# Patient Record
Sex: FEMALE | Race: WHITE | Employment: OTHER | URBAN - METROPOLITAN AREA
[De-identification: names, ages, dates, MRNs, and addresses within clinical notes are randomized per-mention and may not be internally consistent; named-entity substitution may affect disease eponyms.]

---

## 2012-09-08 LAB — PAP SMEAR, EXTERNAL: NORMAL

## 2017-11-01 LAB — COLONOSCOPY, EXTERNAL: NORMAL

## 2020-02-19 LAB — HBA1C MFR BLD HPLC: 6.4 %

## 2021-06-14 ENCOUNTER — OFFICE VISIT (OUTPATIENT)
Dept: FAMILY MEDICINE CLINIC | Age: 86
End: 2021-06-14
Payer: MEDICARE

## 2021-06-14 VITALS
OXYGEN SATURATION: 97 % | RESPIRATION RATE: 12 BRPM | HEART RATE: 56 BPM | HEIGHT: 58 IN | TEMPERATURE: 97 F | BODY MASS INDEX: 22.46 KG/M2 | SYSTOLIC BLOOD PRESSURE: 97 MMHG | WEIGHT: 107 LBS | DIASTOLIC BLOOD PRESSURE: 58 MMHG

## 2021-06-14 DIAGNOSIS — R29.898 WEAKNESS OF BOTH LEGS: ICD-10-CM

## 2021-06-14 DIAGNOSIS — Z76.89 ENCOUNTER TO ESTABLISH CARE: Primary | ICD-10-CM

## 2021-06-14 DIAGNOSIS — E78.5 HYPERLIPIDEMIA, UNSPECIFIED HYPERLIPIDEMIA TYPE: ICD-10-CM

## 2021-06-14 PROCEDURE — 1101F PT FALLS ASSESS-DOCD LE1/YR: CPT | Performed by: FAMILY MEDICINE

## 2021-06-14 PROCEDURE — G8420 CALC BMI NORM PARAMETERS: HCPCS | Performed by: FAMILY MEDICINE

## 2021-06-14 PROCEDURE — G8432 DEP SCR NOT DOC, RNG: HCPCS | Performed by: FAMILY MEDICINE

## 2021-06-14 PROCEDURE — 99203 OFFICE O/P NEW LOW 30 MIN: CPT | Performed by: FAMILY MEDICINE

## 2021-06-14 PROCEDURE — G8427 DOCREV CUR MEDS BY ELIG CLIN: HCPCS | Performed by: FAMILY MEDICINE

## 2021-06-14 PROCEDURE — 1090F PRES/ABSN URINE INCON ASSESS: CPT | Performed by: FAMILY MEDICINE

## 2021-06-14 PROCEDURE — G8536 NO DOC ELDER MAL SCRN: HCPCS | Performed by: FAMILY MEDICINE

## 2021-06-14 RX ORDER — PRAVASTATIN SODIUM 20 MG/1
20 TABLET ORAL
Qty: 90 TABLET | Refills: 0 | Status: SHIPPED | OUTPATIENT
Start: 2021-06-14 | End: 2021-09-17 | Stop reason: SDUPTHER

## 2021-06-14 RX ORDER — AMLODIPINE BESYLATE 10 MG/1
10 TABLET ORAL DAILY
COMMUNITY
End: 2021-09-02

## 2021-06-14 RX ORDER — PRAVASTATIN SODIUM 20 MG/1
20 TABLET ORAL
COMMUNITY
End: 2021-06-14 | Stop reason: SDUPTHER

## 2021-06-14 RX ORDER — LOSARTAN POTASSIUM 50 MG/1
50 TABLET ORAL DAILY
COMMUNITY
End: 2021-08-23 | Stop reason: SDUPTHER

## 2021-06-14 RX ORDER — BRIMONIDINE TARTRATE 1.5 MG/ML
1 SOLUTION/ DROPS OPHTHALMIC 3 TIMES DAILY
COMMUNITY

## 2021-06-14 RX ORDER — LEVOTHYROXINE SODIUM 75 UG/1
TABLET ORAL
COMMUNITY
End: 2021-09-02

## 2021-06-14 RX ORDER — MULTIVITAMIN
1 TABLET ORAL DAILY
COMMUNITY

## 2021-06-14 RX ORDER — METFORMIN HYDROCHLORIDE 500 MG/1
TABLET, EXTENDED RELEASE ORAL
COMMUNITY
End: 2021-10-29 | Stop reason: SDUPTHER

## 2021-06-14 NOTE — PROGRESS NOTES
Bruna Thapa is a 80 y.o. female who presents to the office today with the following:  Chief Complaint   Patient presents with   [address]       HPI   Moved here from [place]    DM  Last BW 3/21, too early to have it done again  No BS checks  Per daughter prediabetic    HTN  BP is usually 120  Not drinking much water  Getting weak standing    Hyperlipidemia   No SE with meds    Hypothyroid    No calf pain  Legs weak and scared to walk  Curriculet 10y ago  Watching TV most of the time  Can't stand a long time   Legs get tired  Walks only in the house    Out of Pravastatin for 3 d  Needs refills      Review of Systems   Constitutional: Negative for fever. HENT: Negative for congestion. Respiratory: Negative for cough and shortness of breath. Cardiovascular: Positive for leg swelling (left foot sometimes). Negative for chest pain. Gastrointestinal: Negative for abdominal pain, blood in stool and melena. Genitourinary: Negative for hematuria. Neurological: Negative for dizziness and headaches. See HPI. Past Medical History:   Diagnosis Date    Diabetes ([address]     Glaucoma     Hypercholesterolemia     Hypertension     Thyroid disease        Past Surgical History:   Procedure Laterality Date    HX CATARACT REMOVAL Bilateral 2018       No Known Allergies    Current Outpatient Medications   Medication Sig    amLODIPine (NORVASC) 10 mg tablet Take 10 mg by mouth daily.  metFORMIN ER (GLUCOPHAGE XR) 500 mg tablet Take  by mouth daily (with dinner).  levothyroxine (SYNTHROID) 75 mcg tablet Take  by mouth Daily (before breakfast).  losartan (COZAAR) 50 mg tablet Take 50 mg by mouth daily.  brimonidine (ALPHAGAN) 0.15 % ophthalmic solution Administer 1 Drop to both eyes three (3) times daily.  calcium-cholecalciferol, D3, (CALTRATE 600+D) tablet Take 1 Tablet by mouth daily.  pravastatin (PRAVACHOL) 20 mg tablet Take 1 Tablet by mouth nightly.      No current facility-administered medications for this visit. Social History     Socioeconomic History    Marital status:      Spouse name: Not on file    Number of children: Not on file    Years of education: Not on file    Highest education level: Not on file   Tobacco Use    Smoking status: Never Smoker     Social Determinants of Health     Financial Resource Strain:     Difficulty of Paying Living Expenses:    Food Insecurity:     Worried About Running Out of Food in the Last Year:     [address] in the Last Year:    Transportation Needs:     Lack of Transportation (Medical):  Lack of Transportation (Non-Medical):    Physical Activity:     Days of Exercise per Week:     Minutes of Exercise per Session:    Stress:     Feeling of Stress :    Social Connections:     Frequency of Communication with Friends and Family:     Frequency of Social Gatherings with Friends and Family:     Attends [employer] Services:     Active Member of Clubs or Organizations:     Attends Club or Organization Meetings:     Marital Status:        No family history on file. Physical Exam:  Visit Vitals  BP (!) 97/58   Pulse (!) 56   Temp 97 °F (36.1 °C)   Resp 12   Ht 4' 10\" (1.473 m)   Wt 107 lb (48.5 kg)   SpO2 97%   BMI 22.36 kg/m²     Physical Exam  Vitals and nursing note reviewed. Constitutional:       Appearance: She is normal weight. HENT:      Head: Normocephalic and atraumatic. Right Ear: Tympanic membrane, ear canal and external ear normal.      Left Ear: Tympanic membrane, ear canal and external ear normal.   Eyes:      Extraocular Movements: Extraocular movements intact. Conjunctiva/sclera: Conjunctivae normal.   Cardiovascular:      Rate and Rhythm: Normal rate and regular rhythm. Pulses: Normal pulses. Heart sounds: Normal heart sounds. Pulmonary:      Effort: Pulmonary effort is normal.      Breath sounds: Normal breath sounds. Abdominal:      General: Abdomen is flat.  There is no distension. Palpations: Abdomen is soft. Tenderness: There is no abdominal tenderness. There is no right CVA tenderness, left CVA tenderness or guarding. Musculoskeletal:      Right lower leg: No edema. Left lower leg: Edema present. Comments: Trace of edema in left foot, no calf tenderness and neg Keyla's   Lymphadenopathy:      Cervical: No cervical adenopathy. Skin:     General: Skin is warm and dry. Neurological:      General: No focal deficit present. Mental Status: She is alert and oriented to person, place, and time. Psychiatric:         Mood and Affect: Mood normal.         Behavior: Behavior normal.           Assessment/Plan:    ICD-10-CM ICD-9-CM    1. Encounter to establish care  Z76.89 V65.8    2. Hyperlipidemia, unspecified hyperlipidemia type  E78.5 272.4 pravastatin (PRAVACHOL) 20 mg tablet   3.  Weakness of both legs  R29.898 729.89 REFERRAL TO PHYSICAL THERAPY   get old records  F/U in 1 mo for BW  Encouraged to increase water and walk more      Keegan Vasquez MD

## 2021-07-01 ENCOUNTER — HOSPITAL ENCOUNTER (OUTPATIENT)
Dept: PHYSICAL THERAPY | Age: 86
Discharge: HOME | End: 2021-07-01
Payer: MEDICARE

## 2021-07-01 DIAGNOSIS — R29.898 WEAKNESS OF BOTH LEGS: ICD-10-CM

## 2021-07-01 PROCEDURE — 97110 THERAPEUTIC EXERCISES: CPT

## 2021-07-01 PROCEDURE — 97161 PT EVAL LOW COMPLEX 20 MIN: CPT

## 2021-07-01 PROCEDURE — 97116 GAIT TRAINING THERAPY: CPT

## 2021-07-01 NOTE — PROGRESS NOTES
PT INITIAL EVALUATION NOTE - [facility] 2-15    Patient Name: Thao Caro  Date:2021  : 1932  [x]  Patient  Verified  Payor: Christian Leonardo / Plan: [place] MED[place] PART A & B / Product Type: Medicare /    In time:1050  Out time:1135  Total Treatment Time (min): 45  Total Timed Codes (min): 45  1:1 Treatment Time ( only): 39   Visit #: 1     Treatment Area: Muscle weakness (generalized) [M62.81]    SUBJECTIVE  Pain Level (0-10 scale): 0  Any medication changes, allergies to medications, adverse drug reactions, diagnosis change, or new procedure performed?: [] No    [x] Yes (see summary sheet for update)  Subjective:    Patient is with her daughter, daughter reports that patient is afraid of falling and patient has been weak in her legs. Patient recently moved in with daughter.     PLOF: ambulates ind, dresses and bathes  Living Situation: lives with daughter and family  Pt Goals: \"walk normally\", feel more confident with walking  Motivation: good  Cognition: A & O x 4        OBJECTIVE/EXAMINATION  Posture:  MR shoulders, decreased lumbar lordosis, looks down while standing  Gait and Functional Mobility:  Decreased to minimal ankle DF at heelstrike, shuffles feet, does not scan when ambulating   ROM: LEs WFL    MMT: bilateral LEs grossly 4-/5  Neurological: Reflexes / Sensations: LE sensation intact        12 min Therapeutic Exercise:  [x] See flow sheet :   Rationale: increase ROM and increase strength to improve the patients ability to ambulate safely    15 min Gait Training:  _120 x 4__ feet with out___ device on level surfaces with _CGA__ level of assist, working on increasing hip and knee flexion during swing, scanning   Rationale: increase ROM, increase strength, improve coordination and improve balance  to improve the patients ability to ambulate safely          With   [] TE   [] TA   [] neuro   [] other: Patient Education: [x] Review HEP    [] Progressed/Changed HEP based on:   [] positioning   [] body mechanics   [] transfers   [] heat/ice application    [] other:      Other Objective/Functional Measures:Tinetti 14/28, FOTO 71% impairment    Pain Level (0-10 scale) post treatment: 0    ASSESSMENT/Changes in Function:     [x]  See Plan of [address], PT 7/1/2021

## 2021-07-06 NOTE — PROGRESS NOTES
[facility]  [address] [place], [facility], [address] [facility]:  [phone]  FAX: [phone]    Plan of Care/Statement of Necessity for Physical Therapy Services  2-15    Patient name: Filippo Kelly  : 1932  Provider#: [id number]  Referral source: Karyle Nobles, MD      Medical/Treatment Diagnosis: Weakness of both legs [R29.898]     Prior Hospitalization: see medical history     Comorbidities: thyroid problem  Prior Level of Function: lives with daughter, amb ind without a device  Medications: Verified on Patient Summary List  Start of Care: 21      Onset Date: 21   The Plan of Care and following information is based on the information from the initial evaluation. Assessment/ key information: Patient presents with decreased balance and at risk of falls. She will benefit from skilled PT for gait and balance training, education, and exercise.       Evaluation Complexity History MEDIUM  Complexity : 1-2 comorbidities / personal factors will impact the outcome/ POC ; Examination MEDIUM Complexity : 3 Standardized tests and measures addressing body structure, function, activity limitation and / or participation in recreation  ;Presentation LOW Complexity : Stable, uncomplicated  ;Clinical Decision Making LOW Complexity : FOTO score of   Overall Complexity Rating: LOW     Problem List: decrease strength and impaired gait/ balance   Treatment Plan may include any combination of the following: Therapeutic exercise, Therapeutic activities, Neuromuscular re-education, Gait/balance training, Patient education, Functional mobility training, Home safety training and Stair training  Patient / Family readiness to learn indicated by: asking questions, trying to perform skills and interest  Persons(s) to be included in education: patient (P), and patient's daughter  ,Barriers to Learning/Limitations: None  Patient Goal (s): to walk normally and feel more confident with walking  Patient Self Reported Health Status: good  Rehabilitation Potential: good    Short Term Goals: To be accomplished in 8 treatments:  1. Patient will be independent in a HEP. 2.  Patient will report she is able to do light activities around the home with a little bit of difficulty. 3.  Patient will score 17/28 or greater on the Tinetti to indicate improved balance. Long Term Goals: To be accomplished in 16 treatments:  1. Patient and daughter will report that patient is ambulating with increased balance around the home. 2.  Patient will score 19/28 on the Tinetti to indicate improved balance. Frequency / Duration: Patient to be seen 1-2 times per week for 16 treatments. Patient/ Caregiver education and instruction: exercises    [x]  Plan of care has been reviewed with PTA    The severity rating is based on clinical judgment and the FOTO Score and Other tinetti score. Certification Period: 7/1/21-10/1/21  Ben Majano, PT 7/1/21   ________________________________________________________________________    I certify that the above Therapy Services are being furnished while the patient is under my care. I agree with the treatment plan and certify that this therapy is necessary. [address] [facility] Signature:____________________  Date:____________Time: _________           Grisel Dennis MD    Please sign and return to: [address] [facility]  [address] [place], [place], [address]:  [address]: [phone]

## 2021-07-08 ENCOUNTER — APPOINTMENT (OUTPATIENT)
Dept: PHYSICAL THERAPY | Age: 86
End: 2021-07-08
Payer: MEDICARE

## 2021-07-13 ENCOUNTER — OFFICE VISIT (OUTPATIENT)
Dept: FAMILY MEDICINE CLINIC | Age: 86
End: 2021-07-13
Payer: MEDICARE

## 2021-07-13 VITALS
DIASTOLIC BLOOD PRESSURE: 66 MMHG | TEMPERATURE: 96.9 F | SYSTOLIC BLOOD PRESSURE: 115 MMHG | RESPIRATION RATE: 97 BRPM | HEART RATE: 57 BPM | WEIGHT: 108 LBS | HEIGHT: 58 IN | BODY MASS INDEX: 22.67 KG/M2

## 2021-07-13 DIAGNOSIS — I10 ESSENTIAL HYPERTENSION: ICD-10-CM

## 2021-07-13 DIAGNOSIS — Z00.00 MEDICARE ANNUAL WELLNESS VISIT, SUBSEQUENT: Primary | ICD-10-CM

## 2021-07-13 DIAGNOSIS — E03.9 ACQUIRED HYPOTHYROIDISM: ICD-10-CM

## 2021-07-13 DIAGNOSIS — E78.5 HYPERLIPIDEMIA, UNSPECIFIED HYPERLIPIDEMIA TYPE: ICD-10-CM

## 2021-07-13 DIAGNOSIS — E11.9 TYPE 2 DIABETES MELLITUS WITHOUT COMPLICATION, WITHOUT LONG-TERM CURRENT USE OF INSULIN (HCC): ICD-10-CM

## 2021-07-13 PROCEDURE — G8420 CALC BMI NORM PARAMETERS: HCPCS | Performed by: FAMILY MEDICINE

## 2021-07-13 PROCEDURE — 99214 OFFICE O/P EST MOD 30 MIN: CPT | Performed by: FAMILY MEDICINE

## 2021-07-13 PROCEDURE — 1101F PT FALLS ASSESS-DOCD LE1/YR: CPT | Performed by: FAMILY MEDICINE

## 2021-07-13 PROCEDURE — 36415 COLL VENOUS BLD VENIPUNCTURE: CPT | Performed by: FAMILY MEDICINE

## 2021-07-13 PROCEDURE — G8427 DOCREV CUR MEDS BY ELIG CLIN: HCPCS | Performed by: FAMILY MEDICINE

## 2021-07-13 PROCEDURE — G0439 PPPS, SUBSEQ VISIT: HCPCS | Performed by: FAMILY MEDICINE

## 2021-07-13 PROCEDURE — G8510 SCR DEP NEG, NO PLAN REQD: HCPCS | Performed by: FAMILY MEDICINE

## 2021-07-13 PROCEDURE — 1090F PRES/ABSN URINE INCON ASSESS: CPT | Performed by: FAMILY MEDICINE

## 2021-07-13 PROCEDURE — G8536 NO DOC ELDER MAL SCRN: HCPCS | Performed by: FAMILY MEDICINE

## 2021-07-13 NOTE — PATIENT INSTRUCTIONS

## 2021-07-13 NOTE — ACP (ADVANCE CARE PLANNING)
Non-Provider Advance Care Planning (ACP) Note    Date of ACP Conversation: 7/13/2021  Persons included in Conversation: patient  Length of ACP Conversation in minutes: <16 minutes (Non-Billable)    Conversation requested by:   Patient  Provider    Authorized Decision Maker (if patient is incapable of making informed decisions):    This person is:  Next of Kin by law (only applies in absence of a Healthcare Power of  or Legal Guardian)        General ACP for ALL Patients with Decision Making Capacity:    Advance Directive Conversation with Patients who have not yet planned:  Importance of advance care planning, including choosing a healthcare agent to communicate patient's healthcare decisions if patient lost the ability to make decisions, such as after a sudden illness or accident    Review of Existing Advance Directive: (Select questions covered)  na    Interventions Provided:  Provided ACP educational materials:  Conversation Starter Kit  Advance Directive Form

## 2021-07-13 NOTE — PROGRESS NOTES
1. Have you been to the ER, urgent care clinic since your last visit? Hospitalized since your last visit? No    2. Have you seen or consulted any other health care providers outside of the [address] [place] [address] since your last visit? Include any pap smears or colon screening. No  This is the Subsequent Medicare Annual Wellness Exam, performed 12 months or more after the Initial AWV or the last Subsequent AWV    I have reviewed the patient's medical history in detail and updated the computerized patient record. Assessment/Plan   Education and counseling provided:  Are appropriate based on today's review and evaluation    1. Medicare annual wellness visit, subsequent       Depression Risk Factor Screening     3 most recent PHQ Screens 7/13/2021   Little interest or pleasure in doing things Not at all   Feeling down, depressed, irritable, or hopeless Not at all   Total Score PHQ 2 0       Functional Ability:   Does the patient exhibit a steady gait? yes   How long did it take the patient to get up and walk from a sitting position? 3sec   Is the patient self reliant?  (ie can do own laundry, meals, household chores)  no     Does the patient handle his/her own medications? yes     Does the patient handle his/her own money? no     Is the patients home safe (ie good lighting, handrails on stairs and bath, etc.)? yes     Did you notice or did patient express any hearing difficulties? no     Did you notice or did patient express any vision difficulties?   no     Were distance and reading eye charts used? no       Advance Care Planning:   Patient was offered the opportunity to discuss advance care planning:  yes     Does patient have an Advance Directive:  no   If no, did you provide information on Caring Connections?   yes     ADL Assessment 6/14/2021   Feeding yourself No Help Needed   Getting from bed to chair No Help Needed   Getting dressed No Help Needed   Bathing or showering No Help Needed   Walk across the room (includes cane/walker) No Help Needed   Using the telphone No Help Needed   Taking your medications No Help Needed   Preparing meals No Help Needed   Managing money (expenses/bills) No Help Needed   Moderately strenuous housework (laundry) No Help Needed   Shopping for personal items (toiletries/medicines) Help Needed   Shopping for groceries Help Needed   Driving Help Needed   Climbing a flight of stairs No Help Needed   Getting to places beyond walking distances Help Needed       Abuse Screening Questionnaire 6/14/2021   Do you ever feel afraid of your partner? N   Are you in a relationship with someone who physically or mentally threatens you? N   Is it safe for you to go home? Y         Alcohol Risk Screen    Do you average more than 1 drink per night or more than 7 drinks a week:  No    On any one occasion in the past three months have you have had more than 3 drinks containing alcohol:  No        Functional Ability and Level of Safety    Hearing: Hearing is good. Activities of Daily Living: The home contains: no safety equipment. Patient needs help with:  transportation      Ambulation: with no difficulty     Fall Risk:  Fall Risk Assessment, last 12 mths 6/14/2021   Able to walk? Yes      Abuse Screen:  Patient is not abused       Cognitive Screening    Has your family/caregiver stated any concerns about your memory: no     Cognitive Screening: na    Health Maintenance Due     Health Maintenance Due   Topic Date Due    Lipid Screen  Never done    DTaP/Tdap/Td series (1 - Tdap) Never done    Shingrix Vaccine Age 50> (1 of 2) Never done    Bone Densitometry (Dexa) Screening  Never done    Pneumococcal 65+ years (1 of 1 - PPSV23) Never done       Patient Care Team   Patient Care Team:  Carina Faye MD as PCP - General (Family Medicine)  Carina Faye MD as PCP - [facility] Empaneled Provider    History   There is no problem list on file for this patient.     Past Medical History:   Diagnosis Date    Diabetes ([facility] [address]     Glaucoma     Hypercholesterolemia     Hypertension     Thyroid disease       Past Surgical History:   Procedure Laterality Date    HX CATARACT REMOVAL Bilateral 2018     Current Outpatient Medications   Medication Sig Dispense Refill    amLODIPine (NORVASC) 10 mg tablet Take 10 mg by mouth daily.  levothyroxine (SYNTHROID) 75 mcg tablet Take  by mouth Daily (before breakfast).  losartan (COZAAR) 50 mg tablet Take 50 mg by mouth daily.  brimonidine (ALPHAGAN) 0.15 % ophthalmic solution Administer 1 Drop to both eyes three (3) times daily.  calcium-cholecalciferol, D3, (CALTRATE 600+D) tablet Take 1 Tablet by mouth daily.  metFORMIN ER (GLUCOPHAGE XR) 500 mg tablet Take  by mouth daily (with dinner).  pravastatin (PRAVACHOL) 20 mg tablet Take 1 Tablet by mouth nightly. 90 Tablet 0     No Known Allergies    No family history on file.   Social History     Tobacco Use    Smoking status: Never Smoker   Substance Use Topics    Alcohol use: Not on file         Caity Nelson LPN

## 2021-07-13 NOTE — PROGRESS NOTES
Bear Osborne is a 80 y.o. female who presents to the office today with the following:  Chief Complaint   Patient presents with    Cholesterol Problem     follow up   [address]       HPI  HM reviewed    HTN  BP is good    Hyperlipidemia  No SE    DM  No BS checks    Hypothyroid      Review of Systems   Constitutional: Negative for weight loss. HENT: Negative for congestion. Eyes: Negative for blurred vision. Respiratory: Negative for cough. Cardiovascular: Negative for chest pain and palpitations. Gastrointestinal: Negative for constipation and diarrhea. Genitourinary: Negative for frequency. Endo/Heme/Allergies: Negative for polydipsia. Psychiatric/Behavioral: Negative for depression. The patient is not nervous/anxious. See HPI. Past Medical History:   Diagnosis Date    Diabetes ([facility] [address]     Glaucoma     Hypercholesterolemia     Hypertension     Thyroid disease        Past Surgical History:   Procedure Laterality Date    HX CATARACT REMOVAL Bilateral 2018       No Known Allergies    Current Outpatient Medications   Medication Sig    amLODIPine (NORVASC) 10 mg tablet Take 10 mg by mouth daily.  levothyroxine (SYNTHROID) 75 mcg tablet Take  by mouth Daily (before breakfast).  losartan (COZAAR) 50 mg tablet Take 50 mg by mouth daily.  brimonidine (ALPHAGAN) 0.15 % ophthalmic solution Administer 1 Drop to both eyes three (3) times daily.  calcium-cholecalciferol, D3, (CALTRATE 600+D) tablet Take 1 Tablet by mouth daily.  metFORMIN ER (GLUCOPHAGE XR) 500 mg tablet Take  by mouth daily (with dinner).  pravastatin (PRAVACHOL) 20 mg tablet Take 1 Tablet by mouth nightly. No current facility-administered medications for this visit.        Social History     Socioeconomic History    Marital status:      Spouse name: Not on file    Number of children: Not on file    Years of education: Not on file    Highest education level: Not on file Tobacco Use    Smoking status: Never Smoker     Social Determinants of Health     Financial Resource Strain:     Difficulty of Paying Living Expenses:    Food Insecurity:     Worried About Running Out of Food in the Last Year:     [address] in the Last Year:    Transportation Needs:     Lack of Transportation (Medical):  Lack of Transportation (Non-Medical):    Physical Activity:     Days of Exercise per Week:     Minutes of Exercise per Session:    Stress:     Feeling of Stress :    Social Connections:     Frequency of Communication with Friends and Family:     Frequency of Social Gatherings with Friends and Family:     Attends [employer] Services:     Active Member of Clubs or Organizations:     Attends Club or Organization Meetings:     Marital Status:        No family history on file. Physical Exam:  Visit Vitals  /66 (BP 1 Location: Left upper arm)   Pulse (!) 57   Temp 96.9 °F (36.1 °C)   Resp (!) 97   Ht 4' 10\" (1.473 m)   Wt 108 lb (49 kg)   BMI 22.57 kg/m²     Physical Exam  Vitals and nursing note reviewed. Constitutional:       Appearance: She is normal weight. HENT:      Head: Normocephalic and atraumatic. Right Ear: Tympanic membrane, ear canal and external ear normal.      Left Ear: Tympanic membrane, ear canal and external ear normal.   Eyes:      Extraocular Movements: Extraocular movements intact. Conjunctiva/sclera: Conjunctivae normal.   Cardiovascular:      Rate and Rhythm: Normal rate and regular rhythm. Heart sounds: Normal heart sounds. Pulmonary:      Effort: Pulmonary effort is normal.      Breath sounds: Normal breath sounds. Abdominal:      General: Abdomen is flat. There is no distension. Palpations: Abdomen is soft. Tenderness: There is no abdominal tenderness. There is no right CVA tenderness, left CVA tenderness or guarding. Musculoskeletal:      Right lower leg: No edema. Left lower leg: No edema. Lymphadenopathy:      Cervical: No cervical adenopathy. Skin:     General: Skin is warm and dry. Neurological:      Mental Status: She is alert and oriented to person, place, and time. Psychiatric:         Mood and Affect: Mood normal.         Behavior: Behavior normal.         Assessment/Plan:    ICD-10-CM ICD-9-CM    1. Medicare annual wellness visit, subsequent  Z00.00 V70.0    2. Hyperlipidemia, unspecified hyperlipidemia type  E78.5 272.4 LIPID PANEL      METABOLIC PANEL, COMPREHENSIVE      METABOLIC PANEL, COMPREHENSIVE      LIPID PANEL   3. Essential hypertension  I10 401.9 CBC WITH AUTOMATED DIFF      METABOLIC PANEL, COMPREHENSIVE      METABOLIC PANEL, COMPREHENSIVE      CBC WITH AUTOMATED DIFF   4. Acquired hypothyroidism  E03.9 244.9 TSH 3RD GENERATION      TSH 3RD GENERATION   5.  Type 2 diabetes mellitus without complication, without long-term current use of insulin (HCC)  E11.9 250.00 HEMOGLOBIN A1C WITH EAG      HEMOGLOBIN A1C WITH Marciafarrukh Marcus MD

## 2021-07-14 ENCOUNTER — HOSPITAL ENCOUNTER (OUTPATIENT)
Dept: PHYSICAL THERAPY | Age: 86
Discharge: HOME | End: 2021-07-14
Payer: MEDICARE

## 2021-07-14 LAB
ALBUMIN SERPL-MCNC: 4 G/DL (ref 3.5–5)
ALBUMIN/GLOB SERPL: 1.3 {RATIO} (ref 1.1–2.2)
ALP SERPL-CCNC: 66 U/L (ref 45–117)
ALT SERPL-CCNC: 16 U/L (ref 12–78)
ANION GAP SERPL CALC-SCNC: 5 MMOL/L (ref 5–15)
AST SERPL-CCNC: 17 U/L (ref 15–37)
BASOPHILS # BLD: 0 K/UL (ref 0–0.1)
BASOPHILS NFR BLD: 1 % (ref 0–1)
BILIRUB SERPL-MCNC: 0.4 MG/DL (ref 0.2–1)
BUN SERPL-MCNC: 23 MG/DL (ref 6–20)
BUN/CREAT SERPL: 27 (ref 12–20)
CALCIUM SERPL-MCNC: 10.2 MG/DL (ref 8.5–10.1)
CHLORIDE SERPL-SCNC: 106 MMOL/L (ref 97–108)
CHOLEST SERPL-MCNC: 207 MG/DL
CO2 SERPL-SCNC: 29 MMOL/L (ref 21–32)
CREAT SERPL-MCNC: 0.85 MG/DL (ref 0.55–1.02)
DIFFERENTIAL METHOD BLD: NORMAL
EOSINOPHIL # BLD: 0.3 K/UL (ref 0–0.4)
EOSINOPHIL NFR BLD: 6 % (ref 0–7)
ERYTHROCYTE [DISTWIDTH] IN BLOOD BY AUTOMATED COUNT: 12.5 % (ref 11.5–14.5)
EST. AVERAGE GLUCOSE BLD GHB EST-MCNC: 128 MG/DL
GLOBULIN SER CALC-MCNC: 3 G/DL (ref 2–4)
GLUCOSE SERPL-MCNC: 93 MG/DL (ref 65–100)
HBA1C MFR BLD: 6.1 % (ref 4–5.6)
HCT VFR BLD AUTO: 44.9 % (ref 35–47)
HDLC SERPL-MCNC: 110 MG/DL
HDLC SERPL: 1.9 {RATIO} (ref 0–5)
HGB BLD-MCNC: 14.1 G/DL (ref 11.5–16)
IMM GRANULOCYTES # BLD AUTO: 0 K/UL (ref 0–0.04)
IMM GRANULOCYTES NFR BLD AUTO: 0 % (ref 0–0.5)
LDLC SERPL CALC-MCNC: 82.8 MG/DL (ref 0–100)
LYMPHOCYTES # BLD: 1.3 K/UL (ref 0.8–3.5)
LYMPHOCYTES NFR BLD: 25 % (ref 12–49)
MCH RBC QN AUTO: 30.5 PG (ref 26–34)
MCHC RBC AUTO-ENTMCNC: 31.4 G/DL (ref 30–36.5)
MCV RBC AUTO: 97 FL (ref 80–99)
MONOCYTES # BLD: 0.6 K/UL (ref 0–1)
MONOCYTES NFR BLD: 11 % (ref 5–13)
NEUTS SEG # BLD: 2.9 K/UL (ref 1.8–8)
NEUTS SEG NFR BLD: 57 % (ref 32–75)
NRBC # BLD: 0 K/UL (ref 0–0.01)
NRBC BLD-RTO: 0 PER 100 WBC
PLATELET # BLD AUTO: 153 K/UL (ref 150–400)
POTASSIUM SERPL-SCNC: 4.9 MMOL/L (ref 3.5–5.1)
PROT SERPL-MCNC: 7 G/DL (ref 6.4–8.2)
RBC # BLD AUTO: 4.63 M/UL (ref 3.8–5.2)
SODIUM SERPL-SCNC: 140 MMOL/L (ref 136–145)
TRIGL SERPL-MCNC: 71 MG/DL (ref ?–150)
TSH SERPL DL<=0.05 MIU/L-ACNC: 1.6 UIU/ML (ref 0.36–3.74)
VLDLC SERPL CALC-MCNC: 14.2 MG/DL
WBC # BLD AUTO: 5.1 K/UL (ref 3.6–11)

## 2021-07-14 PROCEDURE — 97110 THERAPEUTIC EXERCISES: CPT

## 2021-07-14 PROCEDURE — 97116 GAIT TRAINING THERAPY: CPT

## 2021-07-14 NOTE — PROGRESS NOTES
Call pt, the Hba1C is 6.1, still in the prediabetes range  The thyroid test is normal  The electrolytes, kidney and liver tests are ok  The CBC is normal  The Chol is ok  Cont current meds and diet and recheck in 6 mo

## 2021-07-14 NOTE — PROGRESS NOTES
PT DAILY TREATMENT NOTE - [facility]     Patient Name: Gonzalo Bettencourt  Date:2021  : 1932  [x]  Patient  Verified  Payor: Malka Downing / Plan: [place] MED[place] / Product Type: Medicare /    In time:10:40a  Out time:11:18a  Total Treatment Time (min): 38  Total Timed Codes (min): 38  1:1 Treatment Time ( only): 45   Visit #: 2 of 16    Treatment Area: Muscle weakness (generalized) [M62.81]    SUBJECTIVE  Pain Level (0-10 scale): 0/10  Any medication changes, allergies to medications, adverse drug reactions, diagnosis change, or new procedure performed?: [x] No    [] Yes (see summary sheet for update)  Subjective functional status/changes:   [] No changes reported  Pt reports doing her exercises everyday and feeling good. OBJECTIVE    28 min Therapeutic Exercise:  [x] See flow sheet :   Rationale: increase strength and improve coordination to improve the patients ability to perform activities in standing without fatigue and ambulate with proper mechanics. 10 min Gait Training:  _300__ feet with __no_ device on level surfaces with _SBA-CGA__ level of assist   Rationale: To improve bilateral step clearance and length. Instructions on increasing heel strike and rolling through mid-foot to have more natural gait pattern. With   [x] TE   [] TA   [] neuro   [] other: Patient Education: [x] Review HEP    [] Progressed/Changed HEP based on:   [] positioning   [] body mechanics   [] transfers   [] heat/ice application    [] other:      Other Objective/Functional Measures: Pt maintained standing NBOS with EO for 20 seconds x 3 trials with SBA for safety. Pt exhibits decreased step length and clearance bilaterally, but more evident on R LE with step to gait pattern at times. Pt exhibits adequate ROM, but possibly requires strengthening in hip flexors/extensors.     Pain Level (0-10 scale) post treatment: 0/10    ASSESSMENT/Changes in Function: Pt is compliant with HEP and has a supportive family to help with progression of therapy. She is motivated and willing to participate. She does require rest breaks at times due to fatigue. Patient will continue to benefit from skilled PT services to modify and progress therapeutic interventions, address functional mobility deficits, address strength deficits, analyze and cue movement patterns, analyze and modify body mechanics/ergonomics and address imbalance/dizziness to attain remaining goals. [x]  See Plan of Care  []  See progress note/recertification  []  See Discharge Summary         Progress towards goals / Updated goals:  Pt is progressing towards goals.     PLAN  [x]  Upgrade activities as tolerated     [x]  Continue plan of care  [x]  Update interventions per flow sheet       []  Discharge due to:_  []  Other:_      Harmony Collins PTA 7/14/2021

## 2021-07-21 ENCOUNTER — HOSPITAL ENCOUNTER (OUTPATIENT)
Dept: PHYSICAL THERAPY | Age: 86
Discharge: HOME | End: 2021-07-21
Payer: MEDICARE

## 2021-07-21 PROCEDURE — 97110 THERAPEUTIC EXERCISES: CPT

## 2021-07-21 PROCEDURE — 97112 NEUROMUSCULAR REEDUCATION: CPT

## 2021-07-21 NOTE — PROGRESS NOTES
PT DAILY TREATMENT NOTE - [facility]     Patient Name: Linus Aocsta  Date:2021  : 1932  [x]  Patient  Verified  Payor: Pako Gonzales / Plan: [place] MEDICARE PART A & B / Product Type: Medicare /    In time:10:50a  Out time:11:28a  Total Treatment Time (min): 38  Total Timed Codes (min): 38  1:1 Treatment Time ( only): 38   Visit #: 3 of 16    Treatment Area: Muscle weakness (generalized) [M62.81]    SUBJECTIVE  Pain Level (0-10 scale): 0/10  Any medication changes, allergies to medications, adverse drug reactions, diagnosis change, or new procedure performed?: [x] No    [] Yes (see summary sheet for update)  Subjective functional status/changes:   [] No changes reported  Pt states that she has been doing her exercises while watching tv. Her daughter reports that she has a sedentary lifestyle and doesn't use her muscles much, she sits around the house all day. OBJECTIVE    15 min Therapeutic Exercise:  [x] See flow sheet :   Rationale: increase strength to improve the patients ability to perform daily activities without significant fatigue. 23 min Neuromuscular Re-education:  [x]  See flow sheet :   Rationale: improve coordination, improve balance and increase proprioception  to improve the patients ability to perform daily activities without LOB or need for UE support to maintain balance. With   [x] TE   [] TA   [] neuro   [] other: Patient Education: [x] Review HEP    [] Progressed/Changed HEP based on:   [] positioning   [] body mechanics   [] transfers   [] heat/ice application    [] other:      Other Objective/Functional Measures: Pt has decreased hip and ankle strategy with head nods and turns. Pt externally rotates LE with side steps. Pain Level (0-10 scale) post treatment: 0/10    ASSESSMENT/Changes in Function: Pt continues to improve standing NBOS balance.  She is complaint with HEP for strengthening, but requires reminders about performing balance exercises with supervision at home. Pt is generally sedentary at home, which keeps her activity tolerance low at this point. Patient will continue to benefit from skilled PT services to modify and progress therapeutic interventions, address functional mobility deficits, address strength deficits, analyze and cue movement patterns, analyze and modify body mechanics/ergonomics, address imbalance/dizziness and instruct in home and community integration to attain remaining goals. [x]  See Plan of Care  []  See progress note/recertification  []  See Discharge Summary         Progress towards goals / Updated goals:  Pt is progressing towards goals.     PLAN  [x]  Upgrade activities as tolerated     [x]  Continue plan of care  [x]  Update interventions per flow sheet       []  Discharge due to:_  []  Other:_      Kristina Field, PTA 7/21/2021

## 2021-07-27 ENCOUNTER — HOSPITAL ENCOUNTER (OUTPATIENT)
Dept: PHYSICAL THERAPY | Age: 86
Discharge: HOME | End: 2021-07-27
Payer: MEDICARE

## 2021-07-27 PROCEDURE — 97112 NEUROMUSCULAR REEDUCATION: CPT

## 2021-07-27 NOTE — PROGRESS NOTES
PT DAILY TREATMENT NOTE - [facility]     Patient Name: Jacob Yoandy  Date:2021  : 1932  [x]  Patient  Verified  Payor: Ulises Vides / Plan: [place] MEDICARE PART A & B / Product Type: Medicare /    In time:9:57a  Out time:10:33a  Total Treatment Time (min):36  Total Timed Codes (min): 36  1:1 Treatment Time ([address] only): 36   Visit #: 4 of 16    Treatment Area: Muscle weakness (generalized) [M62.81]    SUBJECTIVE  Pain Level (0-10 scale): 0/10  Any medication changes, allergies to medications, adverse drug reactions, diagnosis change, or new procedure performed?: [x] No    [] Yes (see summary sheet for update)  Subjective functional status/changes:   [] No changes reported  Pt states she has been doing good. She is working on her exercises everyday and she reports walking with granddaughter. OBJECTIVE    10 min Therapeutic Exercise:  [x] See flow sheet :   Rationale: increase strength to improve the patients ability to perform activities without significant fatigue and with proper body mechanics. 26 min Neuromuscular Re-education:  [x]  See flow sheet :   Rationale: improve coordination, improve balance and increase proprioception  to improve the patients ability to ambulate with decreased fall risk, perform standing tasks without LOB, increase reciprocal arm swing without LOB       With   [x] TE   [] TA   [] neuro   [] other: Patient Education: [x] Review HEP    [x] Progressed/Changed HEP based on:   [x] positioning   [] body mechanics   [] transfers   [] heat/ice application    [] other:      Other Objective/Functional Measures: Pt experienced no LOB with head turns/nods in WBOS and EO balancing with NBOS, will progress next session to head turns/nods in NBOS and EO balancing with heel to instep foot positioning. Pt exhibits increased heel strike and step length during ambulation, but decreased arm swing, which requires consistent cueing.  Pt is easily distracted with ambulation and experienced LOB x 2 with inattention. Pain Level (0-10 scale) post treatment: 0/10    ASSESSMENT/Changes in Function: Pt continues to improve with PT session. She is able to maintain standing balance with altered COG without LOB. Pt still has trouble with dynamic balance especially when looking in other directions. She would benefit from skilled PT services to continue to challenge dynamic balance and decrease fall risk with ambulation. Patient will continue to benefit from skilled PT services to modify and progress therapeutic interventions, address functional mobility deficits, address strength deficits, analyze and cue movement patterns, analyze and modify body mechanics/ergonomics, address imbalance/dizziness and instruct in home and community integration to attain remaining goals. [x]  See Plan of Care  []  See progress note/recertification  []  See Discharge Summary         Progress towards goals / Updated goals:  Pt is progressing towards goals.     PLAN  [x]  Upgrade activities as tolerated     [x]  Continue plan of care  [x]  Update interventions per flow sheet       []  Discharge due to:_  []  Other:_      Manan Glass, PTA 7/27/2021

## 2021-07-29 ENCOUNTER — HOSPITAL ENCOUNTER (OUTPATIENT)
Dept: PHYSICAL THERAPY | Age: 86
Discharge: HOME | End: 2021-07-29
Payer: MEDICARE

## 2021-07-29 PROCEDURE — 97110 THERAPEUTIC EXERCISES: CPT

## 2021-07-29 PROCEDURE — 97112 NEUROMUSCULAR REEDUCATION: CPT

## 2021-07-29 NOTE — PROGRESS NOTES
PT DAILY TREATMENT NOTE - [facility]     Patient Name: Zully Garcia  Date:2021  : 1932  [x]  Patient  Verified  Payor: Natalia Peaks / Plan: [place] MEDICARE PART A & B / Product Type: Medicare /    In time:10:00a  Out time:10:38a  Total Treatment Time (min): 38  Total Timed Codes (min): 38  1:1 Treatment Time ( only): 38   Visit #: 5 of 16    Treatment Area: Muscle weakness (generalized) [M62.81]    SUBJECTIVE  Pain Level (0-10 scale): 0/10  Any medication changes, allergies to medications, adverse drug reactions, diagnosis change, or new procedure performed?: [x] No    [] Yes (see summary sheet for update)  Subjective functional status/changes:   [] No changes reported  Pt states she is doing well. OBJECTIVE    10 min Therapeutic Exercise:  [] See flow sheet :   Rationale: increase ROM and increase strength to improve the patients ability to perform daily activities without significant fatigue and with proper gait mechanics. 28 min Neuromuscular Re-education:  []  See flow sheet :   Rationale: improve coordination, improve balance and increase proprioception  to improve the patients ability to ambulate with decreased fall risk and decreased LOB with standing dynamic tasks. With   [x] TE   [] TA   [] neuro   [] other: Patient Education: [x] Review HEP    [] Progressed/Changed HEP based on:   [] positioning   [] body mechanics   [] transfers   [] heat/ice application    [] other:      Other Objective/Functional Measures: Pt progressed to NBOS head turns/nods and heel to instep for EO balancing with minimal wobbling. Pain Level (0-10 scale) post treatment: 0/10    ASSESSMENT/Changes in Function: Pt continues to improve standing balance in therapy sessions. She still exhibits unsteadiness and short stride with ambulation, occasionally reaching towards furniture for support. Pt improves gait with cueing and encouragement, but unsure of what it is like at home.     Patient will continue to benefit from skilled PT services to modify and progress therapeutic interventions, address functional mobility deficits, address strength deficits, analyze and cue movement patterns, analyze and modify body mechanics/ergonomics, address imbalance/dizziness and instruct in home and community integration to attain remaining goals. [x]  See Plan of Care  []  See progress note/recertification  []  See Discharge Summary         Progress towards goals / Updated goals:  Pt is progressing towards goals.     PLAN  [x]  Upgrade activities as tolerated     [x]  Continue plan of care  [x]  Update interventions per flow sheet       []  Discharge due to:_  []  Other:_      Daniel Haley, LAURA 7/29/2021

## 2021-08-04 ENCOUNTER — HOSPITAL ENCOUNTER (OUTPATIENT)
Dept: PHYSICAL THERAPY | Age: 86
Discharge: HOME | End: 2021-08-04
Payer: MEDICARE

## 2021-08-04 PROCEDURE — 97110 THERAPEUTIC EXERCISES: CPT

## 2021-08-04 PROCEDURE — 97116 GAIT TRAINING THERAPY: CPT

## 2021-08-04 PROCEDURE — 97112 NEUROMUSCULAR REEDUCATION: CPT

## 2021-08-04 NOTE — PROGRESS NOTES
PT DAILY TREATMENT NOTE - [facility]     Patient Name: Amarjit Ernst  Date:2021  : 1932  [x]  Patient  Verified  Payor: Lisha Vega / Plan: [place] MEDICARE PART A & B / Product Type: Medicare /    In time:11:30a  Out time:12:10p  Total Treatment Time (min): 40  Total Timed Codes (min): 40  1:1 Treatment Time ([address] only): 40   Visit #: 6 of 16    Treatment Area: Muscle weakness (generalized) [M62.81]    SUBJECTIVE  Pain Level (0-10 scale): 0/10  Any medication changes, allergies to medications, adverse drug reactions, diagnosis change, or new procedure performed?: [x] No    [] Yes (see summary sheet for update)  Subjective functional status/changes:   [] No changes reported  Pt reports doing her exercises at home every day. OBJECTIVE    22 min Therapeutic Exercise:  [x] See flow sheet :   Rationale: increase ROM and increase strength to improve the patients ability to perform daily activities with decreased fatigue of LE    10 min Neuromuscular Re-education:  [x]  See flow sheet :   Rationale: improve coordination, improve balance and increase proprioception  to improve the patients ability to perform standing dynamic balance. 8 min Gait Training:  _300__ feet with _no__ device on level surfaces with _SBA__ level of assist   Rationale: With   [x] TE   [] TA   [x] neuro   [] other: Patient Education: [x] Review HEP    [] Progressed/Changed HEP based on:   [] positioning   [] body mechanics   [] transfers   [] heat/ice application    [] other:      Other Objective/Functional Measures: Pt exhibited decreased weight shift with ambulation prior to advancing each foot. Pain Level (0-10 scale) post treatment: 0/10    ASSESSMENT/Changes in Function: Pt continues to improve with PT services. See progress note for details.     Patient will continue to benefit from skilled PT services to modify and progress therapeutic interventions, address functional mobility deficits, address strength deficits, analyze and cue movement patterns, analyze and modify body mechanics/ergonomics, address imbalance/dizziness and instruct in home and community integration to attain remaining goals.      []  See Plan of Care  [x]  See progress note/recertification  []  See Discharge Summary         Progress towards goals / Updated goals:  Pt is progressing towards goals    PLAN  [x]  Upgrade activities as tolerated     [x]  Continue plan of care  [x]  Update interventions per flow sheet       []  Discharge due to:_  []  Other:_      Jefe Diego, LAURA 8/4/2021

## 2021-08-05 NOTE — PROGRESS NOTES
[facility]  [address], [address] [place] [address]  Phone: [phone] Fax: [phone]    Progress Note     Name: Lisa Cunningham   : 1932            MD: Lili Khan MD     Treatment Diagnosis: Muscle weakness (generalized) [M62.81]  Start of Care: 21                      Visits from Start of Care: 6  Missed Visits: 1     Summary of Care: Pt has been seen for 6 visits for LE weakness and balance. While she is still a high fall risk, pt has improved her standing balance with increased narrow stance as well as increased her Tinetti score from a 14 to 18. Pt is compliant with HEP, but also lives a sedentary lifestyle, where she sits at home most of the day according to the daughter.  Pt continues to improve and would continue to benefit from skilled PT services to improve dynamic balance and gait mechanics.      Assessment / Recommendations: Continue skilled PT 1-2x/wk up to 16 visits to work towards goals     Short Term Goals: To be accomplished in 8 treatments:  1.  Patient will be independent in a HEP- MET  2.  Patient will report she is able to do light activities around the home with a little bit of difficulty- MET  3.  Patient will score 17/28 or greater on the Tinetti to indicate improved balance- MET  Long Term Goals: To be accomplished in 16 treatments:  1.  Patient and daughter will report that patient is ambulating with increased balance around the home- PROGRESSING  2.  Patient will score 19/28 on the Tinetti to indicate improved balance- Michaela Eastman 24, PT 2021

## 2021-08-10 ENCOUNTER — APPOINTMENT (OUTPATIENT)
Dept: PHYSICAL THERAPY | Age: 86
End: 2021-08-10
Payer: MEDICARE

## 2021-08-17 ENCOUNTER — HOSPITAL ENCOUNTER (OUTPATIENT)
Dept: PHYSICAL THERAPY | Age: 86
Discharge: HOME | End: 2021-08-17
Payer: MEDICARE

## 2021-08-17 PROCEDURE — 97112 NEUROMUSCULAR REEDUCATION: CPT

## 2021-08-17 PROCEDURE — 97116 GAIT TRAINING THERAPY: CPT

## 2021-08-17 NOTE — PROGRESS NOTES
PT DAILY TREATMENT NOTE - [facility]     Patient Name: Demetria Avila  Date:2021  : 1932  [x]  Patient  Verified  Payor: Jojo Vazquez / Plan: [place] MEDICARE PART A & B / Product Type: Medicare /    In time:11:35a  Out time:12:10p  Total Treatment Time (min): 35  Total Timed Codes (min): 35  1:1 Treatment Time ( [address] only): 35   Visit #: 7 of 16    Treatment Area: Muscle weakness (generalized) [M62.81]    SUBJECTIVE  Pain Level (0-10 scale): 0/10  Any medication changes, allergies to medications, adverse drug reactions, diagnosis change, or new procedure performed?: [x] No    [] Yes (see summary sheet for update)  Subjective functional status/changes:   [] No changes reported  Pt reports performing exercises daily at home. Granddaughter reports working on increasing ambulation daily for 30-60 minutes and that she has noticed she is less wobbly at home when moving around. OBJECTIVE    27 min Neuromuscular Re-education:  []  See flow sheet :   Rationale: improve coordination, improve balance and increase proprioception  to improve the patients ability to perform standing dynamic activities at home without increased fall risk or LOB. 8 min Gait Training:  _300__ feet with _no__ device on level surfaces with _contact__ level of assist   Rationale: Improve initial heel strike to decrease flat foot ambulation, increase reciprocal arm swing for normalized gait and promote forward gaze and room scanning to decrease forward lurched posture. With   [] TE   [] TA   [] neuro   [] other: Patient Education: [x] Review HEP    [] Progressed/Changed HEP based on:   [] positioning   [] body mechanics   [] transfers   [] heat/ice application    [] other:      Other Objective/Functional Measures: Pt required no UE for standing balance activities or dynamic standing exercises. Did require 1 UE for step ups with 4'' step.      Pain Level (0-10 scale) post treatment: 0/10    ASSESSMENT/Changes in Function: Pt increases her independence with balance activities. She still exhibits gait deviations, but unsure if they are habitual vs decrease in balance. She has not demonstrated LOB with ambulation training as she has in the past and is more trusting of balance activities without UE use. Patient will continue to benefit from skilled PT services to modify and progress therapeutic interventions, address functional mobility deficits, analyze and cue movement patterns, analyze and modify body mechanics/ergonomics, assess and modify postural abnormalities, address imbalance/dizziness and instruct in home and community integration to attain remaining goals. [x]  See Plan of Care  []  See progress note/recertification  []  See Discharge Summary         Progress towards goals / Updated goals:  Pt is progressing towards goals.     PLAN  [x]  Upgrade activities as tolerated     [x]  Continue plan of care  [x]  Update interventions per flow sheet       []  Discharge due to:_  []  Other:_      Daniel Haley, PTA 8/17/2021

## 2021-08-20 ENCOUNTER — HOSPITAL ENCOUNTER (OUTPATIENT)
Dept: PHYSICAL THERAPY | Age: 86
Discharge: HOME | End: 2021-08-20
Payer: MEDICARE

## 2021-08-20 PROCEDURE — 97112 NEUROMUSCULAR REEDUCATION: CPT

## 2021-08-20 PROCEDURE — 97110 THERAPEUTIC EXERCISES: CPT

## 2021-08-20 NOTE — PROGRESS NOTES
PT DAILY TREATMENT NOTE - [facility]     Patient Name: Marly Blanc  Date:2021  : 1932  [x]  Patient  Verified  Payor: Gemini Giron / Plan: [place] MEDICARE PART A & B / Product Type: Medicare /    In time:10:00a  Out time:10:40a  Total Treatment Time (min): 40  Total Timed Codes (min): 40  1:1 Treatment Time ( [address] only): 40   Visit #: 8 of 16    Treatment Area: Muscle weakness (generalized) [M62.81]    SUBJECTIVE  Pain Level (0-10 scale): 0/10  Any medication changes, allergies to medications, adverse drug reactions, diagnosis change, or new procedure performed?: [x] No    [] Yes (see summary sheet for update)  Subjective functional status/changes:   [] No changes reported  Pt reports doing her exercises every morning after she eats breakfast.    OBJECTIVE    15 min Therapeutic Exercise:  [x] See flow sheet :   Rationale: increase strength and improve coordination to improve the patients ability to perform daily activities without increased fatigue or limitations for independence. 25 min Neuromuscular Re-education:  [x]  See flow sheet :   Rationale: improve coordination, improve balance and increase proprioception  to improve the patients ability to perform standing activities without LOB or decreased independence with balance recovery in standing. With   [] TE   [] TA   [] neuro   [] other: Patient Education: [x] Review HEP    [] Progressed/Changed HEP based on:   [] positioning   [] body mechanics   [] transfers   [] heat/ice application    [] other:      Other Objective/Functional Measures: Pt ambulates with appropriate reciprocal arm swing. She had 1 LOB with R LE due to decreased DF during swing phase. Pt able to perform standing balance with heel to bunion for 15 seconds x 2 each leg. Pain Level (0-10 scale) post treatment: 0/10    ASSESSMENT/Changes in Function: Patient continues to improve gait mechanics and balancing with PT services.  She is more frequently using heel strike and reciprocal arm swing during ambulation and is increasing hip flexion during gait for decreased toe drag on floor. She is compliant with HEP and motivated. Patient still at times experiences minor LOB which scare her most of the time due to inattention and looking away from destination. Patient at times reaches for items to furniture walk in clinic, but when corrected, is stable to ambulate independently with verbal cues. Patient will continue to benefit from skilled PT services to modify and progress therapeutic interventions, address functional mobility deficits, address strength deficits, analyze and cue movement patterns, analyze and modify body mechanics/ergonomics, address imbalance/dizziness and instruct in home and community integration to attain remaining goals. [x]  See Plan of Care  []  See progress note/recertification  []  See Discharge Summary         Progress towards goals / Updated goals:  Progressing towards goals.     PLAN  [x]  Upgrade activities as tolerated     [x]  Continue plan of care  [x]  Update interventions per flow sheet       []  Discharge due to:_  []  Other:_      Bety Stallings, PTA 8/20/2021

## 2021-08-23 DIAGNOSIS — I10 ESSENTIAL HYPERTENSION: Primary | ICD-10-CM

## 2021-08-23 RX ORDER — LOSARTAN POTASSIUM 50 MG/1
50 TABLET ORAL DAILY
Qty: 90 TABLET | Refills: 1 | Status: SHIPPED | OUTPATIENT
Start: 2021-08-23 | End: 2022-01-10 | Stop reason: SDUPTHER

## 2021-08-24 ENCOUNTER — HOSPITAL ENCOUNTER (OUTPATIENT)
Dept: PHYSICAL THERAPY | Age: 86
Discharge: HOME | End: 2021-08-24
Payer: MEDICARE

## 2021-08-24 PROCEDURE — 97112 NEUROMUSCULAR REEDUCATION: CPT

## 2021-08-24 PROCEDURE — 97110 THERAPEUTIC EXERCISES: CPT

## 2021-08-24 NOTE — PROGRESS NOTES
PT DAILY TREATMENT NOTE - [facility]     Patient Name: Raj Varela  Date:2021  : 1932  [x]  Patient  Verified  Payor: Miguel Wills / Plan: [place] MED[place] / Product Type: Medicare /    In time:335  Out time:425  Total Treatment Time (min): 50  Total Timed Codes (min): 50  1:1 Treatment Time ( [address] only): 50   Visit #: 9  16    Treatment Area: Muscle weakness (generalized) [M62.81]    SUBJECTIVE  Pain Level (0-10 scale): 0  Any medication changes, allergies to medications, adverse drug reactions, diagnosis change, or new procedure performed?: [x] No    [] Yes (see summary sheet for update)  Subjective functional status/changes:   [] No changes reported  Granddaughter reports pt has improved. In tight spaces at home granddaughter reports patient is furniture walking. OBJECTIVE  15 min Therapeutic Exercise:  [x]? See flow sheet :   Rationale: increase strength and improve coordination to improve the patients ability to perform daily activities without increased fatigue or limitations for independence.     25 min Neuromuscular Re-education:  [x]? See flow sheet :   Rationale: improve coordination, improve balance and increase proprioception  to improve the patients ability to perform standing activities without LOB or decreased independence with balance recovery in standing. With   [] TE   [] TA   [] neuro   [] other: Patient Education: [x] Review HEP    [] Progressed/Changed HEP based on:   [] positioning   [] body mechanics   [] transfers   [] heat/ice application    [] other:         Pain Level (0-10 scale) post treatment: 0    ASSESSMENT/Changes in Function: verbal cues to take time when changing directions with ambulation or performing activities. Patient tends to move quickly at times. She continues to work towards goals.      Patient will continue to benefit from skilled PT services to modify and progress therapeutic interventions and address functional mobility deficits to attain remaining goals.      [x]  See Plan of Care  []  See progress note/recertification  []  See Discharge Summary         Progress towards goals / Updated goals:  Short Term Goals: To be accomplished in 8 treatments:  1.  Patient will be independent in a HEP- MET  2.  Patient will report she is able to do light activities around the home with a little bit of difficulty- MET  3.  Patient will score 17/28 or greater on the Tinetti to indicate improved balance- MET  Long Term Goals: To be accomplished in 16 treatments:  1.  Patient and daughter will report that patient is ambulating with increased balance around the home- PROGRESSING  2.  Patient will score 19/28 on the Tinetti to indicate improved balance- PROGRESSING    PLAN  []  Upgrade activities as tolerated     [x]  Continue plan of care  []  Update interventions per flow sheet       []  Discharge due to:_  []  Other:_      Anh Hanley, PT 8/24/2021

## 2021-08-26 ENCOUNTER — HOSPITAL ENCOUNTER (OUTPATIENT)
Dept: PHYSICAL THERAPY | Age: 86
Discharge: HOME | End: 2021-08-26
Payer: MEDICARE

## 2021-08-26 PROCEDURE — 97112 NEUROMUSCULAR REEDUCATION: CPT

## 2021-08-26 PROCEDURE — 97110 THERAPEUTIC EXERCISES: CPT

## 2021-08-26 NOTE — PROGRESS NOTES
PT DAILY TREATMENT NOTE - [facility]     Patient Name: Demetria Avila  Date:2021  : 1932  [x]  Patient  Verified  Payor: Jojo Vazquez / Plan: [place] MEDICARE PART A & B / Product Type: Medicare /    In time:9:58a  Out time:10:36a  Total Treatment Time (min): 38  Total Timed Codes (min): 38  1:1 Treatment Time ( only): 38   Visit #: 10  16    Treatment Area: Muscle weakness (generalized) [M62.81]    SUBJECTIVE  Pain Level (0-10 scale): 0/10  Any medication changes, allergies to medications, adverse drug reactions, diagnosis change, or new procedure performed?: [x] No    [] Yes (see summary sheet for update)  Subjective functional status/changes:   [x] No changes reported    OBJECTIVE    25 min Therapeutic Exercise:  [x] See flow sheet :   Rationale: increase ROM, increase strength and improve coordination to improve the patients ability to perform daily activities without limitations. 11 min Neuromuscular Re-education:  [x]  See flow sheet :   Rationale: improve coordination, improve balance and increase proprioception  to improve the patients ability to decrease fall risk and increased independence with activities around the house. With   [] TE   [] TA   [] neuro   [] other: Patient Education: [x] Review HEP    [] Progressed/Changed HEP based on:   [] positioning   [] body mechanics   [] transfers   [] heat/ice application    [] other:      Other Objective/Functional Measures: ---     Pain Level (0-10 scale) post treatment: 0/10    ASSESSMENT/Changes in Function: Patient continues to improve with PT services. She relies less on UE support with activities. She does require encouragement to slow down tasks to help maintain balance rather than move quickly without gaining balance first. She is performing exercises at home daily and has been walking with granddaughter for 30-40 minutes a day. She has a great support system at home which is encouraging her progress.      Patient will continue to benefit from skilled PT services to modify and progress therapeutic interventions, address functional mobility deficits, address strength deficits, analyze and cue movement patterns, analyze and modify body mechanics/ergonomics, address imbalance/dizziness and instruct in home and community integration to attain remaining goals. [x]  See Plan of Care  []  See progress note/recertification  []  See Discharge Summary         Progress towards goals / Updated goals:  Pt is progressing towards goals.      PLAN  [x]  Upgrade activities as tolerated     [x]  Continue plan of care  [x]  Update interventions per flow sheet       []  Discharge due to:_  []  Other:_      Manan Glass, PTA 8/26/2021

## 2021-08-31 ENCOUNTER — HOSPITAL ENCOUNTER (OUTPATIENT)
Dept: PHYSICAL THERAPY | Age: 86
Discharge: HOME | End: 2021-08-31
Payer: MEDICARE

## 2021-08-31 PROCEDURE — 97112 NEUROMUSCULAR REEDUCATION: CPT

## 2021-08-31 PROCEDURE — 97110 THERAPEUTIC EXERCISES: CPT

## 2021-08-31 NOTE — PROGRESS NOTES
PT DAILY TREATMENT NOTE - [facility]     Patient Name: Bharath Durbin  Date:2021  : 1932  [x]  Patient  Verified  Payor: Liliana Lopes / Plan: [place] MEDICARE PART A & B / Product Type: Medicare /    In time:11:28a  Out time:12:06p  Total Treatment Time (min): 38  Total Timed Codes (min): 38  1:1 Treatment Time ( only): 38   Visit #: 11  16    Treatment Area: Muscle weakness (generalized) [M62.81]    SUBJECTIVE  Pain Level (0-10 scale): 0/10  Any medication changes, allergies to medications, adverse drug reactions, diagnosis change, or new procedure performed?: [x] No    [] Yes (see summary sheet for update)  Subjective functional status/changes:   [x] No changes reported    OBJECTIVE    10 min Therapeutic Exercise:  [x] See flow sheet :   Rationale: increase ROM and increase strength to improve the patients ability to perform daily activities without significant fatigue. 28 min Neuromuscular Re-education:  [x]  See flow sheet :   Rationale: improve coordination, improve balance and increase proprioception  to improve the patients ability to perform daily activities without increased risks for falls        With   [] TE   [] TA   [] neuro   [] other: Patient Education: [x] Review HEP    [] Progressed/Changed HEP based on:   [] positioning   [] body mechanics   [] transfers   [] heat/ice application    [] other:      Other Objective/Functional Measures: Pt ambulates with WBOS, improved heel strike, occasionally reaches for items for support     Pain Level (0-10 scale) post treatment: 0/10    ASSESSMENT/Changes in Function: Patient continues to improve standing activity tolerance and strengthening activities. She is hesitant with ambulation and balancing without UE use, but does well with cues and SBA.     Patient will continue to benefit from skilled PT services to modify and progress therapeutic interventions, address functional mobility deficits, address strength deficits, analyze and modify body mechanics/ergonomics, address imbalance/dizziness and instruct in home and community integration to attain remaining goals. [x]  See Plan of Care  []  See progress note/recertification  []  See Discharge Summary         Progress towards goals / Updated goals:  Pt is progressing towards goals.     PLAN  [x]  Upgrade activities as tolerated     [x]  Continue plan of care  [x]  Update interventions per flow sheet       []  Discharge due to:_  []  Other:_      Astrid Johnson, PTA 8/31/2021

## 2021-09-03 ENCOUNTER — HOSPITAL ENCOUNTER (OUTPATIENT)
Dept: PHYSICAL THERAPY | Age: 86
Discharge: HOME | End: 2021-09-03
Payer: MEDICARE

## 2021-09-03 PROCEDURE — 97112 NEUROMUSCULAR REEDUCATION: CPT

## 2021-09-03 PROCEDURE — 97110 THERAPEUTIC EXERCISES: CPT

## 2021-09-03 NOTE — PROGRESS NOTES
PT DAILY TREATMENT NOTE - [facility]     Patient Name: Marly Blanc  Date:9/3/2021  : 1932  [x]  Patient  Verified  Payor: Gemini Giron / Plan: [place] MEDICARE PART A & B / Product Type: Medicare /    In time:10:32a  Out time:11:15a  Total Treatment Time (min): 40  Total Timed Codes (min): 40  1:1 Treatment Time ( only): 40   Visit #: 12 of 16    Treatment Area: Muscle weakness (generalized) [M62.81]    SUBJECTIVE  Pain Level (0-10 scale): 0/10  Any medication changes, allergies to medications, adverse drug reactions, diagnosis change, or new procedure performed?: [x] No    [] Yes (see summary sheet for update)  Subjective functional status/changes:   [x] No changes reported    OBJECTIVE    10 min Therapeutic Exercise:  [x] See flow sheet :   Rationale: increase strength to improve the patients ability to perform daily activities without fatigue. 30 min Neuromuscular Re-education:  []  See flow sheet :   Rationale: improve coordination, improve balance and increase proprioception  to improve the patients ability to perform daily activities without increased risk for falls or LOB. With   [] TE   [] TA   [] neuro   [] other: Patient Education: [x] Review HEP    [] Progressed/Changed HEP based on:   [] positioning   [] body mechanics   [] transfers   [] heat/ice application    [] other:      Other Objective/Functional Measures: Tinetti score 22     Pain Level (0-10 scale) post treatment: 0/10    ASSESSMENT/Changes in Function: Patient continues to improve standing balance and gait mechanics. She has been able to improve her Tinetti score, placing her in a lower category for fall risk. She is less inclined to furniture walk in the clinic and has experienced no LOB recently. She is walking around the house more with independence and is able to perform all ADLs without assistance.     Patient will continue to benefit from skilled PT services to modify and progress therapeutic interventions, address functional mobility deficits, analyze and cue movement patterns, address imbalance/dizziness and instruct in home and community integration to attain remaining goals. []  See Plan of Care  [x]  See progress note/recertification  []  See Discharge Summary         Progress towards goals / Updated goals:  Pt is progressing towards goals.     PLAN  [x]  Upgrade activities as tolerated     [x]  Continue plan of care  [x]  Update interventions per flow sheet       []  Discharge due to:_  []  Other:_      Yvonne Johnson, PTA 9/3/2021

## 2021-09-07 NOTE — PROGRESS NOTES
[facility]   [facility], [address] [place] [address]  Phone: [phone]      Fax:  [phone]    Progress Note     Name: Osmar Luciano   : 1932            MD: Sara Stout MD     Treatment Diagnosis: Muscle weakness (generalized) [M62.81]  Start of Care: 21                Visits from Start of Care: 12  Missed Visits: 0     Summary of Care: Pt has been seen for 12 visits to address gait and B LE weakness. She has significantly improved her LE strength and is currently performing open and closed chain activities with resistance. She is demonstrating improved safety with gait.       Assessment / Recommendations: Continue skilled PT for up to 16-20  visits to progress mobility and work towards goals and updated goals.     Short Term Goals: To be accomplished in 8 treatments:  1.  Patient will be independent in a HEP- MET  2.  Patient will report she is able to do light activities around the home with a little bit of difficulty- MET  3.  Patient will score 17/28 or greater on the Tinetti to indicate improved balance- MET  Long Term Goals: To be accomplished in 16 treatments:  1.  Patient and daughter will report that patient is ambulating with increased balance around the home- MET  2.  Patient will score 19/28 on the Tinetti to indicate improved balance- MET     Updated Goals: to be completed in 16 visits  1. Patient will be able to perform sit to/from stand 15x consecutively without use of UE for balance  2. Pt will be able to maintain standing balance with NBOS and EC for 10 seconds  3. Pt will be able to ambulate with heels almost touching with gait       David Abbasi, PT 2021 1:49 PM    ________________________________________________________________________  NOTE TO PHYSICIAN:  Please complete the following and fax to: [facility]: Fax: [phone]. Moraima Aranda Retain this original for your records. If you are unable to process this request in 24 hours, please contact our office.        ____ I have read the above report and request that my patient continue therapy with the following changes/special instructions:  ____ I have read the above report and request that my patient be discharged from therapy    Physician's Signature:_________________ Date:___________Time:__________

## 2021-09-09 ENCOUNTER — HOSPITAL ENCOUNTER (OUTPATIENT)
Dept: PHYSICAL THERAPY | Age: 86
Discharge: HOME | End: 2021-09-09
Payer: MEDICARE

## 2021-09-09 PROCEDURE — 97110 THERAPEUTIC EXERCISES: CPT

## 2021-09-09 PROCEDURE — 97112 NEUROMUSCULAR REEDUCATION: CPT

## 2021-09-09 NOTE — PROGRESS NOTES
PT DAILY TREATMENT NOTE - [facility]     Patient Name: Filippo Kelly  Date:2021  : 1932  [x]  Patient  Verified  Payor: Parul Virgen / Plan: [place] MEDICARE PART A & B / Product Type: Medicare /    In time:10:50a  Out time:11:28a  Total Treatment Time (min): 38  Total Timed Codes (min): 38  1:1 Treatment Time ( only): 38   Visit #: 13 of 16    Treatment Area: Muscle weakness (generalized) [M62.81]    SUBJECTIVE  Pain Level (0-10 scale): 0/10  Any medication changes, allergies to medications, adverse drug reactions, diagnosis change, or new procedure performed?: [x] No    [] Yes (see summary sheet for update)  Subjective functional status/changes:   [x] No changes reported      OBJECTIVE    12 min Therapeutic Exercise:  [x] See flow sheet :   Rationale: increase ROM and increase strength to improve the patients ability to perform daily activities without significant fatigue. 26 min Neuromuscular Re-education:  [x]  See flow sheet :   Rationale: improve coordination, improve balance and increase proprioception  to improve the patients ability to perform standing tasks without increased LOB. With   [] TE   [] TA   [] neuro   [] other: Patient Education: [x] Review HEP    [] Progressed/Changed HEP based on:   [] positioning   [] body mechanics   [] transfers   [] heat/ice application    [] other:      Pain Level (0-10 scale) post treatment: 0/10    ASSESSMENT/Changes in Function: Patient continues to improve standing balance. She still is able to tolerate all standing activities without rest breaks. Pt transfers are safe without LOB upon initial stand and she is working on her gait mechanics consistently.     Patient will continue to benefit from skilled PT services to modify and progress therapeutic interventions, address functional mobility deficits, address strength deficits, analyze and cue movement patterns, analyze and modify body mechanics/ergonomics, address imbalance/dizziness and instruct in home and community integration to attain remaining goals. [x]  See Plan of Care  []  See progress note/recertification  []  See Discharge Summary         Progress towards goals / Updated goals:  Pt is progressing towards goals.     PLAN  [x]  Upgrade activities as tolerated     [x]  Continue plan of care  [x]  Update interventions per flow sheet       []  Discharge due to:_  []  Other:_      Dawna Reyes, PTA 9/9/2021

## 2021-09-14 ENCOUNTER — HOSPITAL ENCOUNTER (OUTPATIENT)
Dept: PHYSICAL THERAPY | Age: 86
Discharge: HOME | End: 2021-09-14
Payer: MEDICARE

## 2021-09-14 PROCEDURE — 97112 NEUROMUSCULAR REEDUCATION: CPT

## 2021-09-14 PROCEDURE — 97110 THERAPEUTIC EXERCISES: CPT

## 2021-09-14 NOTE — PROGRESS NOTES
PT DAILY TREATMENT NOTE - [facility]     Patient Name: Aaron Guthrie  Date:2021  : 1932  [x]  Patient  Verified  Payor: Candace Enamorado / Plan: [place] MEDICARE PART A & B / Product Type: Medicare /    In time:10:38  Out time:11:18a  Total Treatment Time (min): 40  Total Timed Codes (min): 40  1:1 Treatment Time ( [address] only): 40   Visit #: 14 of 20    Treatment Area: Muscle weakness (generalized) [M62.81]    SUBJECTIVE  Pain Level (0-10 scale): 0/10  Any medication changes, allergies to medications, adverse drug reactions, diagnosis change, or new procedure performed?: [x] No    [] Yes (see summary sheet for update)  Subjective functional status/changes:   [] No changes reported  Pt reports no changes. OBJECTIVE    15 min Therapeutic Exercise:  [x] See flow sheet :   Rationale: increase strength to improve the patients ability to perform daily activities. 25 min Neuromuscular Re-education:  [x]  See flow sheet :   Rationale: improve coordination, improve balance and increase proprioception  to improve the patients ability to perform daily activities without increased risk for falls or LOB during standing activities. With   [] TE   [] TA   [] neuro   [] other: Patient Education: [x] Review HEP    [] Progressed/Changed HEP based on:   [] positioning   [] body mechanics   [] transfers   [] heat/ice application    [] other:      Other Objective/Functional Measures: Pt ambulates with slightly WBOS, decrease in step length with tight turns, at times ambulates with arms crossed in front of body     Pain Level (0-10 scale) post treatment: 0/10    ASSESSMENT/Changes in Function: Continued work on LE strengthening with increased weight and resistance. Pt standing balance is improving and her transfers require no UE use and she has no staggering once standing with NBOS.  Focusing on ambulation techniques such as changing directions, walking around obstacles while maintaining normal step length and reciprocal arm swing. Patient will continue to benefit from skilled PT services to modify and progress therapeutic interventions, address functional mobility deficits, address strength deficits, analyze and cue movement patterns, address imbalance/dizziness and instruct in home and community integration to attain remaining goals. [x]  See Plan of Care  []  See progress note/recertification  []  See Discharge Summary         Progress towards goals / Updated goals:  Pt is progressing towards goals.     PLAN  [x]  Upgrade activities as tolerated     [x]  Continue plan of care  [x]  Update interventions per flow sheet       []  Discharge due to:_  []  Other:_      Yvonne Johnson, PTA 9/14/2021

## 2021-09-16 ENCOUNTER — APPOINTMENT (OUTPATIENT)
Dept: PHYSICAL THERAPY | Age: 86
End: 2021-09-16
Payer: MEDICARE

## 2021-09-17 DIAGNOSIS — E78.5 HYPERLIPIDEMIA, UNSPECIFIED HYPERLIPIDEMIA TYPE: ICD-10-CM

## 2021-09-17 RX ORDER — PRAVASTATIN SODIUM 20 MG/1
20 TABLET ORAL
Qty: 90 TABLET | Refills: 0 | Status: SHIPPED | OUTPATIENT
Start: 2021-09-17 | End: 2022-01-10 | Stop reason: SDUPTHER

## 2021-09-21 ENCOUNTER — HOSPITAL ENCOUNTER (OUTPATIENT)
Dept: PHYSICAL THERAPY | Age: 86
Discharge: HOME | End: 2021-09-21
Payer: MEDICARE

## 2021-09-21 PROCEDURE — 97112 NEUROMUSCULAR REEDUCATION: CPT

## 2021-09-21 PROCEDURE — 97110 THERAPEUTIC EXERCISES: CPT

## 2021-09-21 NOTE — PROGRESS NOTES
PT DAILY TREATMENT NOTE - [facility]     Patient Name: Ravi Rivas  Date:2021  : 1932  [x]  Patient  Verified  Payor: Claud Fothergill / Plan: [place] MEDICARE PART A & B / Product Type: Medicare /    In time:9:58a  Out time:10:37a  Total Treatment Time (min): 39  Total Timed Codes (min): 39  1:1 Treatment Time ([address] only): 44   Visit #: 15 of 20    Treatment Area: Muscle weakness (generalized) [M62.81]    SUBJECTIVE  Pain Level (0-10 scale): 0/10  Any medication changes, allergies to medications, adverse drug reactions, diagnosis change, or new procedure performed?: [x] No    [] Yes (see summary sheet for update)  Subjective functional status/changes:   [x] No changes reported      OBJECTIVE    15 min Therapeutic Exercise:  [x] See flow sheet :   Rationale: increase ROM and increase strength to improve the patients ability to perform daily activities without fatigue and to improve proper gait mechanics form. 24 min Neuromuscular Re-education:  [x]  See flow sheet :   Rationale: improve coordination and improve balance  to improve the patients ability to ambulate with decreased fall risk and improve standing dynamic balance for independence with activities around the home. With   [] TE   [] TA   [] neuro   [] other: Patient Education: [x] Review HEP    [] Progressed/Changed HEP based on:   [] positioning   [] body mechanics   [] transfers   [] heat/ice application    [] other:      Other Objective/Functional Measures: ---     Pain Level (0-10 scale) post treatment: 0/10    ASSESSMENT/Changes in Function: Patient continues to improve with PT services. She worked on slowing movements down today especially during direction changes and tight turns to decrease risk for falling. Pt still requires reminders to utilize UE and reciprocal arm swing in tight spaces. She has significantly improved LE strength and activity tolerance. She had some complaints of L knee pain with Nu step today.     Patient will continue to benefit from skilled PT services to modify and progress therapeutic interventions, address functional mobility deficits, address ROM deficits, address strength deficits, analyze and cue movement patterns and address imbalance/dizziness to attain remaining goals. [x]  See Plan of Care  []  See progress note/recertification  []  See Discharge Summary         Progress towards goals / Updated goals:  Pt is progressing towards goals.     PLAN  [x]  Upgrade activities as tolerated     [x]  Continue plan of care  [x]  Update interventions per flow sheet       []  Discharge due to:_  []  Other:_      Avtar Pae, PTA 9/21/2021

## 2021-09-23 ENCOUNTER — APPOINTMENT (OUTPATIENT)
Dept: PHYSICAL THERAPY | Age: 86
End: 2021-09-23
Payer: MEDICARE

## 2021-09-28 ENCOUNTER — HOSPITAL ENCOUNTER (OUTPATIENT)
Dept: PHYSICAL THERAPY | Age: 86
Discharge: HOME | End: 2021-09-28
Payer: MEDICARE

## 2021-09-28 PROCEDURE — 97112 NEUROMUSCULAR REEDUCATION: CPT

## 2021-09-28 PROCEDURE — 97110 THERAPEUTIC EXERCISES: CPT

## 2021-09-28 NOTE — PROGRESS NOTES
PT DAILY TREATMENT NOTE - [facility]     Patient Name: Quintin Abdi  Date:2021  : 1932  [x]  Patient  Verified  Payor: Tacos Arun / Plan: [place] MEDICARE PART A & B / Product Type: Medicare /    In time:3:26p  Out time:4:04p  Total Treatment Time (min): 38  Total Timed Codes (min): 38  1:1 Treatment Time ( only): 38   Visit #: 16 of 20    Treatment Area: Muscle weakness (generalized) [M62.81]    SUBJECTIVE  Pain Level (0-10 scale): 0/10  Any medication changes, allergies to medications, adverse drug reactions, diagnosis change, or new procedure performed?: [x] No    [] Yes (see summary sheet for update)  Subjective functional status/changes:   [x] No changes reported      OBJECTIVE    13 min Therapeutic Exercise:  [x] See flow sheet :   Rationale: increase strength to improve the patients ability to perform daily activities without significant fatigue    25 min Neuromuscular Re-education:  [x]  See flow sheet :   Rationale: improve coordination, improve balance and increase proprioception  to improve the patients ability to ambulate with decreased fall risk, increase balance strategies, improve SLS for better gait mechanics. With   [] TE   [] TA   [] neuro   [] other: Patient Education: [x] Review HEP    [] Progressed/Changed HEP based on:   [] positioning   [] body mechanics   [] transfers   [] heat/ice application    [] other:      Other Objective/Functional Measures: Patient ambulates with decreased SLS time, decreased reciprocal arm swing, decreased heel off     Pain Level (0-10 scale) post treatment: 0/10    ASSESSMENT/Changes in Function: Patient continues to improve standing balance activities. Today worked on reaching out of HUBER for improving balance, working on having patient stop and redirect gait in another direction and turning since those are activities where she most commonly experiences LOB.     Patient will continue to benefit from skilled PT services to modify and progress therapeutic interventions, address functional mobility deficits, address strength deficits, address imbalance/dizziness and instruct in home and community integration to attain remaining goals. [x]  See Plan of Care  []  See progress note/recertification  []  See Discharge Summary         Progress towards goals / Updated goals:  Pt is progressing towards goals.     PLAN  [x]  Upgrade activities as tolerated     [x]  Continue plan of care  [x]  Update interventions per flow sheet       []  Discharge due to:_  []  Other:_      Graciela Bowen, PTA 9/28/2021

## 2021-09-30 ENCOUNTER — HOSPITAL ENCOUNTER (OUTPATIENT)
Dept: PHYSICAL THERAPY | Age: 86
Discharge: HOME | End: 2021-09-30
Payer: MEDICARE

## 2021-09-30 PROCEDURE — 97110 THERAPEUTIC EXERCISES: CPT

## 2021-09-30 PROCEDURE — 97112 NEUROMUSCULAR REEDUCATION: CPT

## 2021-10-05 ENCOUNTER — APPOINTMENT (OUTPATIENT)
Dept: PHYSICAL THERAPY | Age: 86
End: 2021-10-05
Payer: MEDICARE

## 2021-10-07 ENCOUNTER — APPOINTMENT (OUTPATIENT)
Dept: PHYSICAL THERAPY | Age: 86
End: 2021-10-07
Payer: MEDICARE

## 2021-10-12 ENCOUNTER — HOSPITAL ENCOUNTER (OUTPATIENT)
Dept: PHYSICAL THERAPY | Age: 86
Discharge: HOME | End: 2021-10-12
Payer: MEDICARE

## 2021-10-12 PROCEDURE — 97110 THERAPEUTIC EXERCISES: CPT

## 2021-10-12 PROCEDURE — 97112 NEUROMUSCULAR REEDUCATION: CPT

## 2021-10-12 NOTE — PROGRESS NOTES
PT DAILY TREATMENT NOTE - [facility]     Patient Name: Jaqueline Driscoll  Date:10/12/2021  : 1932  [x]  Patient  Verified  Payor: Jasen Kumar / Plan: [place] MEDICARE PART [place] & B / Product Type: Medicare /    In time:10:02a  Out time:10:40a  Total Treatment Time (min): 38  Total Timed Codes (min): 38  1:1 Treatment Time ( only): 38   Visit #: 18 of 18    Treatment Area: Muscle weakness (generalized) [M62.81]    SUBJECTIVE  Pain Level (0-10 scale): 0/10  Any medication changes, allergies to medications, adverse drug reactions, diagnosis change, or new procedure performed?: [x] No    [] Yes (see summary sheet for update)  Subjective functional status/changes:   [] No changes reported  Pt reports feeling fine. OBJECTIVE    10 min Therapeutic Exercise:  [x] See flow sheet :   Rationale: increase strength to improve the patients ability to perform daily activities without fatigue. 28 min Neuromuscular Re-education:  [x]  See flow sheet :   Rationale: improve coordination, improve balance and increase proprioception  to improve the patients ability to perform daily standing activities without an increased risk for falls or LOB. With   [] TE   [] TA   [] neuro   [] other: Patient Education: [x] Review HEP    [] Progressed/Changed HEP based on:   [] positioning   [] body mechanics   [] transfers   [] heat/ice application    [] other:      Other Objective/Functional Measures: Tinetti Score- 25/28     Pain Level (0-10 scale) post treatment: 0/10    ASSESSMENT/Changes in Function: Patient continues to show improvement with standing activites. She was able to perform side steps with lateral reach as well as standing squat  activity both with an item in front of her and beside her without any LOB. Pt continues to improve her standing NBOS sit to/from stand as well. Pt still requires cueing on proper ambulation techniques, however family is aware and offers cueing when able.     Patient will continue to benefit from skilled PT services to instruct in home and community integration to attain remaining goals. []  See Plan of Care  []  See progress note/recertification  [x]  See Discharge Summary         Progress towards goals / Updated goals:  Pt is progressing. PLAN  []  Upgrade activities as tolerated     []  Continue plan of care  []  Update interventions per flow sheet       [x]  Discharge due to: progressing towards goals.   []  Other:_      Earline Silver, PTA 10/12/2021

## 2021-10-13 NOTE — PROGRESS NOTES
PhysicalTherapy Discharge Summary  Patient name: Danita Gitelman Start of Care: 21   Referral source: Sunni Spence MD : 1932   Medical/Treatment Diagnosis: Muscle weakness (generalized) [M62.81] Onset Date:21      Prior Hospitalization: see medical history Provider#: Medications: Verified on Patient Summary List     Comorbidities: thyroid issue  Prior Level of Function: lives with daughter, ambulates ind w/o device  Visits from Start of Care: 18                                                Missed Visits: 4  Reporting Period : 21 to 10/12/21     Summary of Care: Patient has been seen for 18 visits total focusing on weakness of B LE. She has significantly improved B LE strength and is able to perform all exercises for LE strengthening without rest breaks. She also is compliant with HEP strengthening plan at home. Patient has also improved her balance and gait mechanics, which have decreased her fall risk. She initially scored low on the Tinetti, but now is a 25 score, indicative of low fall risk. She has supportive family, which provide supervision and encouragement to continue to maintain her progress. At this time, pt is appropriate for PT discharge.     Short Term Goals: To be accomplished in 8 treatments:  1.  Patient will be independent in a HEP- MET  2.  Patient will report she is able to do light activities around the home with a little bit of difficulty- MET  3.  Patient will score 17/28 or greater on the Tinetti to indicate improved balance- MET  Long Term Goals: To be accomplished in 16 treatments:  1.  Patient and daughter will report that patient is ambulating with increased balance around the home- MET  2.  Patient will score 19/28 on the Tinetti to indicate improved balance- MET     Updated Goals: to be completed in 16 visits  1. Patient will be able to perform sit to/from stand 15x consecutively without use of UE for balance- MET  2.  Pt will be able to maintain standing balance with NBOS and EC for 10 seconds- MET  3. Pt will be able to ambulate with heels almost touching with gait- PROGRESSING     ASSESSMENT/RECOMMENDATIONS:  [x]? Discontinue therapy:           [x]? Patient has reached or is progressing toward set goals                                                  []?Patient is non-compliant or has abdicated                                            []?Due to lack of appreciable progress towards set goals    Joaquin Chavez, PT 10/13/2021

## 2021-10-29 DIAGNOSIS — E11.9 TYPE 2 DIABETES MELLITUS WITHOUT COMPLICATION, WITHOUT LONG-TERM CURRENT USE OF INSULIN (HCC): Primary | ICD-10-CM

## 2021-10-29 RX ORDER — METFORMIN HYDROCHLORIDE 500 MG/1
500 TABLET, EXTENDED RELEASE ORAL
Qty: 90 TABLET | Refills: 0 | Status: SHIPPED | OUTPATIENT
Start: 2021-10-29 | End: 2022-01-10 | Stop reason: SDUPTHER

## 2022-01-10 ENCOUNTER — OFFICE VISIT (OUTPATIENT)
Dept: FAMILY MEDICINE CLINIC | Age: 87
End: 2022-01-10
Payer: MEDICARE

## 2022-01-10 VITALS
RESPIRATION RATE: 12 BRPM | WEIGHT: 110 LBS | TEMPERATURE: 96.4 F | HEIGHT: 58 IN | BODY MASS INDEX: 23.09 KG/M2 | SYSTOLIC BLOOD PRESSURE: 119 MMHG | OXYGEN SATURATION: 97 % | DIASTOLIC BLOOD PRESSURE: 68 MMHG | HEART RATE: 72 BPM

## 2022-01-10 DIAGNOSIS — Z23 ENCOUNTER FOR IMMUNIZATION: ICD-10-CM

## 2022-01-10 DIAGNOSIS — E11.9 TYPE 2 DIABETES MELLITUS WITHOUT COMPLICATION, WITHOUT LONG-TERM CURRENT USE OF INSULIN (HCC): ICD-10-CM

## 2022-01-10 DIAGNOSIS — L60.0 INGROWN NAIL OF GREAT TOE OF RIGHT FOOT: ICD-10-CM

## 2022-01-10 DIAGNOSIS — I10 ESSENTIAL HYPERTENSION: ICD-10-CM

## 2022-01-10 DIAGNOSIS — E78.5 HYPERLIPIDEMIA, UNSPECIFIED HYPERLIPIDEMIA TYPE: Primary | ICD-10-CM

## 2022-01-10 DIAGNOSIS — E03.9 ACQUIRED HYPOTHYROIDISM: ICD-10-CM

## 2022-01-10 PROCEDURE — 99214 OFFICE O/P EST MOD 30 MIN: CPT | Performed by: FAMILY MEDICINE

## 2022-01-10 PROCEDURE — G0009 ADMIN PNEUMOCOCCAL VACCINE: HCPCS

## 2022-01-10 PROCEDURE — 90732 PPSV23 VACC 2 YRS+ SUBQ/IM: CPT

## 2022-01-10 RX ORDER — METFORMIN HYDROCHLORIDE 500 MG/1
500 TABLET, EXTENDED RELEASE ORAL
Qty: 90 TABLET | Refills: 1 | Status: SHIPPED | OUTPATIENT
Start: 2022-01-10 | End: 2022-06-16 | Stop reason: SDUPTHER

## 2022-01-10 RX ORDER — PRAVASTATIN SODIUM 20 MG/1
20 TABLET ORAL
Qty: 90 TABLET | Refills: 1 | Status: SHIPPED | OUTPATIENT
Start: 2022-01-10 | End: 2022-06-16 | Stop reason: SDUPTHER

## 2022-01-10 RX ORDER — LOSARTAN POTASSIUM 50 MG/1
50 TABLET ORAL DAILY
Qty: 90 TABLET | Refills: 1 | Status: SHIPPED | OUTPATIENT
Start: 2022-01-10 | End: 2022-06-16 | Stop reason: SDUPTHER

## 2022-01-10 RX ORDER — LEVOTHYROXINE SODIUM 75 UG/1
75 TABLET ORAL DAILY
Qty: 90 TABLET | Refills: 1 | Status: SHIPPED | OUTPATIENT
Start: 2022-01-10 | End: 2022-06-16 | Stop reason: SDUPTHER

## 2022-01-10 RX ORDER — AMLODIPINE BESYLATE 10 MG/1
10 TABLET ORAL DAILY
Qty: 90 TABLET | Refills: 1 | Status: SHIPPED | OUTPATIENT
Start: 2022-01-10 | End: 2022-06-16 | Stop reason: SDUPTHER

## 2022-01-10 NOTE — PROGRESS NOTES
1. Have you been to the ER, urgent care clinic since your last visit? Hospitalized since your last visit? No    2. Have you seen or consulted any other health care providers outside of the [address] [place] [address] since your last visit? Include any pap smears or colon screening.  No

## 2022-01-10 NOTE — PROGRESS NOTES
Pamela Solano is a 80 y.o. female who presents to the office today with the following:  Chief Complaint   Patient presents with    Cholesterol Problem     ffollow up       HPI  HTN  No SE with meds    Hyperlipidemia  Chol was good 6 mo ago    DM  No BS checks at home    Hypothyroid  Due for recheck    Had a pedicure and since then right great toe hurting    Review of Systems   HENT: Negative for congestion. Respiratory: Negative for cough. Cardiovascular: Negative for chest pain. Gastrointestinal: Negative. Negative for abdominal pain. Genitourinary: Negative. Wearing depends, no sign of infection   Psychiatric/Behavioral: Positive for memory loss. Little more confused per daughter     See HPI. Past Medical History:   Diagnosis Date    Diabetes ([address]     Glaucoma     Hypercholesterolemia     Hypertension     Incontinence of urine in female     Thyroid disease        Past Surgical History:   Procedure Laterality Date    HX CATARACT REMOVAL Bilateral 2018       No Known Allergies    Current Outpatient Medications   Medication Sig    metFORMIN ER (GLUCOPHAGE XR) 500 mg tablet Take 1 Tablet by mouth daily (with dinner).  pravastatin (PRAVACHOL) 20 mg tablet Take 1 Tablet by mouth nightly.  amLODIPine (NORVASC) 10 mg tablet Take 1 Tablet by mouth daily.  levothyroxine (SYNTHROID) 75 mcg tablet Take 1 Tablet by mouth daily.  losartan (COZAAR) 50 mg tablet Take 1 Tablet by mouth daily.  brimonidine (ALPHAGAN) 0.15 % ophthalmic solution Administer 1 Drop to both eyes three (3) times daily.  calcium-cholecalciferol, D3, (CALTRATE 600+D) tablet Take 1 Tablet by mouth daily. No current facility-administered medications for this visit. Social History     Socioeconomic History    Marital status:    Tobacco Use    Smoking status: Never Smoker       No family history on file.       Physical Exam:  Visit Vitals  /68   Pulse 72   Temp (!) 96.4 °F (35.8 °C) Resp 12   Ht 4' 10\" (1.473 m)   Wt 110 lb (49.9 kg)   SpO2 97%   BMI 22.99 kg/m²     Physical Exam  Vitals and nursing note reviewed. Constitutional:       Appearance: She is normal weight. HENT:      Head: Normocephalic and atraumatic. Eyes:      Extraocular Movements: Extraocular movements intact. Conjunctiva/sclera: Conjunctivae normal.   Cardiovascular:      Rate and Rhythm: Normal rate and regular rhythm. Heart sounds: Normal heart sounds. Pulmonary:      Effort: Pulmonary effort is normal.      Breath sounds: Normal breath sounds. Abdominal:      General: Abdomen is flat. There is no distension. Palpations: Abdomen is soft. Tenderness: There is no abdominal tenderness. There is no right CVA tenderness, left CVA tenderness or guarding. Musculoskeletal:      Right lower leg: No edema. Left lower leg: No edema. Lymphadenopathy:      Cervical: No cervical adenopathy. Skin:     General: Skin is warm and dry. Comments: Right great toe nail ingrown on medial side   Neurological:      Mental Status: She is alert and oriented to person, place, and time. Psychiatric:         Mood and Affect: Mood normal.         Behavior: Behavior normal.         Assessment/Plan:    ICD-10-CM ICD-9-CM    1. Hyperlipidemia, unspecified hyperlipidemia type  E78.5 272.4 pravastatin (PRAVACHOL) 20 mg tablet   2. Essential hypertension  P68 992.4 METABOLIC PANEL, COMPREHENSIVE      losartan (COZAAR) 50 mg tablet   3. Acquired hypothyroidism  E03.9 244.9 TSH 3RD GENERATION   4. Type 2 diabetes mellitus without complication, without long-term current use of insulin (HCC)  E11.9 250.00 HEMOGLOBIN A1C WITH EAG      metFORMIN ER (GLUCOPHAGE XR) 500 mg tablet   5. Encounter for immunization  Z23 V03.89 PNEUMOCOCCAL POLYSACCHARIDE VACCINE, 23-VALENT, ADULT OR IMMUNOSUPPRESSED PT DOSE,   6.  Ingrown nail of great toe of right foot  L60.0 703.0      Advised to make apt in a week for wedge excision    Follow-up and Dispositions    · Return in about 1 week (around 1/17/2022).          Carla Gilmore MD

## 2022-01-11 LAB
ALBUMIN SERPL-MCNC: 3.9 G/DL (ref 3.5–5)
ALBUMIN/GLOB SERPL: 1.3 {RATIO} (ref 1.1–2.2)
ALP SERPL-CCNC: 71 U/L (ref 45–117)
ALT SERPL-CCNC: 20 U/L (ref 12–78)
ANION GAP SERPL CALC-SCNC: 5 MMOL/L (ref 5–15)
AST SERPL-CCNC: 13 U/L (ref 15–37)
BILIRUB SERPL-MCNC: 0.3 MG/DL (ref 0.2–1)
BUN SERPL-MCNC: 22 MG/DL (ref 6–20)
BUN/CREAT SERPL: 26 (ref 12–20)
CALCIUM SERPL-MCNC: 9.5 MG/DL (ref 8.5–10.1)
CHLORIDE SERPL-SCNC: 108 MMOL/L (ref 97–108)
CO2 SERPL-SCNC: 28 MMOL/L (ref 21–32)
CREAT SERPL-MCNC: 0.85 MG/DL (ref 0.55–1.02)
EST. AVERAGE GLUCOSE BLD GHB EST-MCNC: 123 MG/DL
GLOBULIN SER CALC-MCNC: 3.1 G/DL (ref 2–4)
GLUCOSE SERPL-MCNC: 107 MG/DL (ref 65–100)
HBA1C MFR BLD: 5.9 % (ref 4–5.6)
POTASSIUM SERPL-SCNC: 4.6 MMOL/L (ref 3.5–5.1)
PROT SERPL-MCNC: 7 G/DL (ref 6.4–8.2)
SODIUM SERPL-SCNC: 141 MMOL/L (ref 136–145)
TSH SERPL DL<=0.05 MIU/L-ACNC: 1.4 UIU/ML (ref 0.36–3.74)

## 2022-01-11 NOTE — PROGRESS NOTES
Call pt and daughter, the thyroid test is normal  The electrolytes, kidney and liver tests are ok  The HbA1C is 5.9, a little better than last time  Cont current meds and recheck in 6 mo

## 2022-01-11 NOTE — PROGRESS NOTES
I have called and talked to this patient's daughter Sri Andersen. I have given her these lab results and recommendations from Dr Tyrell Anne review. Katiuska verbalizes understanding.

## 2022-01-18 ENCOUNTER — OFFICE VISIT (OUTPATIENT)
Dept: FAMILY MEDICINE CLINIC | Age: 87
End: 2022-01-18
Payer: MEDICARE

## 2022-01-18 VITALS
HEIGHT: 58 IN | HEART RATE: 64 BPM | OXYGEN SATURATION: 98 % | RESPIRATION RATE: 14 BRPM | TEMPERATURE: 97.2 F | WEIGHT: 110 LBS | DIASTOLIC BLOOD PRESSURE: 65 MMHG | SYSTOLIC BLOOD PRESSURE: 135 MMHG | BODY MASS INDEX: 23.09 KG/M2

## 2022-01-18 DIAGNOSIS — L60.0 INGROWN NAIL OF GREAT TOE OF RIGHT FOOT: Primary | ICD-10-CM

## 2022-01-18 PROBLEM — E11.9 TYPE 2 DIABETES MELLITUS (HCC): Status: ACTIVE | Noted: 2022-01-18

## 2022-01-18 PROCEDURE — 11750 EXCISION NAIL&NAIL MATRIX: CPT | Performed by: FAMILY MEDICINE

## 2022-01-18 NOTE — PROGRESS NOTES
Kacy Piña is a 80 y.o. female who presents to the office today with the following:  Chief Complaint   Patient presents with    Nail Problem     excision ingrown toenail, R great toe       HPI  Pt here for wedge excision of ingrown nail medial right great toe nail  D/t pain    ROS  See HPI. Past Medical History:   Diagnosis Date    Diabetes ([address]     Glaucoma     Hypercholesterolemia     Hypertension     Incontinence of urine in female     Thyroid disease        Past Surgical History:   Procedure Laterality Date    HX CATARACT REMOVAL Bilateral 2018       No Known Allergies    Current Outpatient Medications   Medication Sig    metFORMIN ER (GLUCOPHAGE XR) 500 mg tablet Take 1 Tablet by mouth daily (with dinner).  pravastatin (PRAVACHOL) 20 mg tablet Take 1 Tablet by mouth nightly.  amLODIPine (NORVASC) 10 mg tablet Take 1 Tablet by mouth daily.  levothyroxine (SYNTHROID) 75 mcg tablet Take 1 Tablet by mouth daily.  losartan (COZAAR) 50 mg tablet Take 1 Tablet by mouth daily.  brimonidine (ALPHAGAN) 0.15 % ophthalmic solution Administer 1 Drop to both eyes three (3) times daily.  calcium-cholecalciferol, D3, (CALTRATE 600+D) tablet Take 1 Tablet by mouth daily. No current facility-administered medications for this visit. Social History     Socioeconomic History    Marital status:    Tobacco Use    Smoking status: Never Smoker       No family history on file. Physical Exam:  Visit Vitals  /65   Pulse 64   Temp 97.2 °F (36.2 °C)   Resp 14   Ht 4' 10\" (1.473 m)   Wt 110 lb (49.9 kg)   SpO2 98%   BMI 22.99 kg/m²     Physical Exam  HENT:      Head: Normocephalic and atraumatic. Eyes:      Extraocular Movements: Extraocular movements intact.       Conjunctiva/sclera: Conjunctivae normal.   Pulmonary:      Effort: Pulmonary effort is normal.   Skin:     Comments: Right great toe nail ingrown medially, no sign of infection, tender   Neurological:      Mental Status: She is alert and oriented to person, place, and time. Psychiatric:         Mood and Affect: Mood normal.         Behavior: Behavior normal.         Consent obtained  5 cc of 1% Lidocaine w/o Epi injected at base of right great toe, 2.5 cc ea side  Area cleaned with ETOH and BEtadine  Wedge excision attempted  Pt still had pain and 2cc of 2% Lidocaine w/o Epi injected at site  Wedge excision performed  And nil bed treated with Phenol  Abx ointment and pressure dressing applied    Assessment/Plan:    ICD-10-CM ICD-9-CM    1.  Ingrown nail of great toe of right foot  L60.0 703.0 REMOVAL OF NAIL BED     Keep dry for 24 h  Then apply ABX ointment and bandaid  monitor for any sign of infection  Tylenol prn for pain      Yuriy Sierra MD

## 2022-01-18 NOTE — PROGRESS NOTES
[facility]  OFFICE PROCEDURE PROGRESS NOTE        Chart reviewed for the following:   Tari DEL CID LPN, have reviewed the History, Physical and updated the Allergic reactions for [address] performed immediately prior to start of procedure:   Tari DEL CID LPN, have performed the following reviews on [employer] prior to the start of the procedure:            * Patient was identified by name and date of birth   * Agreement on procedure being performed was verified  * Risks and Benefits explained to the patient by Hector Kennedy MD  * Procedure site verified and marked as necessary  * Patient was positioned for comfort  * Consent was signed and verified     Time: 1040      Date of procedure: 1/18/2022    Procedure performed by:  Hector Kennedy MD    Provider assisted by: Robby Oliva LPN    Patient assisted by: daughter    Pre Procedural Pain Scale: 04      Procedure start time:  6898    Procedure end time:  7511    How tolerated by patient: tolerated the procedure well with no complications    Post Procedural Pain Scale: 0 - No Hurt    Comments: none    Post op instructions and patient education reviewed. Patient states understanding.     Copy of discharge instructions given to patient in AVS.

## 2022-03-18 PROBLEM — E11.9 TYPE 2 DIABETES MELLITUS (HCC): Status: ACTIVE | Noted: 2022-01-18

## 2022-06-16 ENCOUNTER — OFFICE VISIT (OUTPATIENT)
Dept: FAMILY MEDICINE CLINIC | Age: 87
End: 2022-06-16
Payer: MEDICARE

## 2022-06-16 VITALS
RESPIRATION RATE: 12 BRPM | WEIGHT: 114 LBS | DIASTOLIC BLOOD PRESSURE: 74 MMHG | BODY MASS INDEX: 25.64 KG/M2 | SYSTOLIC BLOOD PRESSURE: 136 MMHG | HEART RATE: 74 BPM | HEIGHT: 56 IN | OXYGEN SATURATION: 98 % | TEMPERATURE: 96.7 F

## 2022-06-16 DIAGNOSIS — R05.9 COUGH: Primary | ICD-10-CM

## 2022-06-16 DIAGNOSIS — I10 ESSENTIAL HYPERTENSION: ICD-10-CM

## 2022-06-16 DIAGNOSIS — E78.5 HYPERLIPIDEMIA, UNSPECIFIED HYPERLIPIDEMIA TYPE: ICD-10-CM

## 2022-06-16 DIAGNOSIS — E03.9 ACQUIRED HYPOTHYROIDISM: ICD-10-CM

## 2022-06-16 DIAGNOSIS — E11.9 TYPE 2 DIABETES MELLITUS WITHOUT COMPLICATION, WITHOUT LONG-TERM CURRENT USE OF INSULIN (HCC): ICD-10-CM

## 2022-06-16 PROCEDURE — 99214 OFFICE O/P EST MOD 30 MIN: CPT | Performed by: FAMILY MEDICINE

## 2022-06-16 RX ORDER — AMLODIPINE BESYLATE 10 MG/1
10 TABLET ORAL DAILY
Qty: 90 TABLET | Refills: 1 | Status: SHIPPED | OUTPATIENT
Start: 2022-06-16

## 2022-06-16 RX ORDER — LOSARTAN POTASSIUM 50 MG/1
50 TABLET ORAL DAILY
Qty: 90 TABLET | Refills: 1 | Status: SHIPPED | OUTPATIENT
Start: 2022-06-16 | End: 2022-07-01 | Stop reason: SINTOL

## 2022-06-16 RX ORDER — LEVOTHYROXINE SODIUM 75 UG/1
75 TABLET ORAL DAILY
Qty: 90 TABLET | Refills: 1 | Status: SHIPPED | OUTPATIENT
Start: 2022-06-16

## 2022-06-16 RX ORDER — LATANOPROST 50 UG/ML
SOLUTION/ DROPS OPHTHALMIC
COMMUNITY
Start: 2022-05-31

## 2022-06-16 RX ORDER — METFORMIN HYDROCHLORIDE 500 MG/1
500 TABLET, EXTENDED RELEASE ORAL
Qty: 90 TABLET | Refills: 1 | Status: SHIPPED | OUTPATIENT
Start: 2022-06-16

## 2022-06-16 RX ORDER — PRAVASTATIN SODIUM 20 MG/1
20 TABLET ORAL
Qty: 90 TABLET | Refills: 1 | Status: SHIPPED | OUTPATIENT
Start: 2022-06-16

## 2022-06-16 NOTE — PROGRESS NOTES
Olga Torres is a 80 y.o. female who presents to the office today with the following:  Chief Complaint   Patient presents with    Thyroid Problem     med refills       HPI  Hypothyroid  Here for 6 mo F/U    Dry cough every day  For months  Daily  Taking halls  And drinks water  Worse in [place]    HTN  BP is good  Needs all refills    Hyperlipidemia  Not fasting today    DM  No BS checks        Review of Systems   Constitutional: Negative for weight loss. HENT: Negative for congestion. Respiratory: Positive for cough. Negative for sputum production and shortness of breath. Cardiovascular: Negative for chest pain and leg swelling. Gastrointestinal: Negative for blood in stool. Neurological: Negative for dizziness. Occ feet hurt     See HPI. Past Medical History:   Diagnosis Date    Diabetes ([address]     Glaucoma     Hypercholesterolemia     Hypertension     Incontinence of urine in female     Thyroid disease        Past Surgical History:   Procedure Laterality Date    HX CATARACT REMOVAL Bilateral 2018       No Known Allergies    Current Outpatient Medications   Medication Sig    latanoprost (XALATAN) 0.005 % ophthalmic solution APPLY ONE DROP TO BOTH EYES ONCE A DAY.  metFORMIN ER (GLUCOPHAGE XR) 500 mg tablet Take 1 Tablet by mouth daily (with dinner).  pravastatin (PRAVACHOL) 20 mg tablet Take 1 Tablet by mouth nightly.  amLODIPine (NORVASC) 10 mg tablet Take 1 Tablet by mouth daily.  levothyroxine (SYNTHROID) 75 mcg tablet Take 1 Tablet by mouth daily.  losartan (COZAAR) 50 mg tablet Take 1 Tablet by mouth daily.  calcium-cholecalciferol, D3, (CALTRATE 600+D) tablet Take 1 Tablet by mouth daily.  brimonidine (ALPHAGAN) 0.15 % ophthalmic solution Administer 1 Drop to both eyes three (3) times daily. (Patient not taking: Reported on 6/16/2022)     No current facility-administered medications for this visit.        Social History     Socioeconomic History    Marital status:    Tobacco Use    Smoking status: Never Smoker       No family history on file. Physical Exam:  Visit Vitals  /74   Pulse 74   Temp (!) 96.7 °F (35.9 °C)   Resp 12   Ht 4' 8\" (1.422 m)   Wt 114 lb (51.7 kg)   SpO2 98%   BMI 25.56 kg/m²     Physical Exam  Vitals and nursing note reviewed. Constitutional:       Appearance: She is normal weight. HENT:      Head: Normocephalic and atraumatic. Right Ear: Tympanic membrane, ear canal and external ear normal.      Left Ear: Tympanic membrane, ear canal and external ear normal.   Eyes:      Extraocular Movements: Extraocular movements intact. Conjunctiva/sclera: Conjunctivae normal.   Cardiovascular:      Rate and Rhythm: Normal rate and regular rhythm. Pulses: Normal pulses. Heart sounds: Normal heart sounds. Pulmonary:      Effort: Pulmonary effort is normal.      Breath sounds: Normal breath sounds. Abdominal:      General: There is no distension. Palpations: Abdomen is soft. Tenderness: There is no abdominal tenderness. There is no right CVA tenderness, left CVA tenderness or guarding. Musculoskeletal:      Right lower leg: No edema. Left lower leg: No edema. Lymphadenopathy:      Cervical: No cervical adenopathy. Skin:     General: Skin is warm and dry. Neurological:      Mental Status: She is alert and oriented to person, place, and time. Psychiatric:         Mood and Affect: Mood normal.         Behavior: Behavior normal.         Assessment/Plan:    ICD-10-CM ICD-9-CM    1. Cough  R05.9 786.2 XR CHEST PA LAT      CBC WITH AUTOMATED DIFF      CBC WITH AUTOMATED DIFF   2.  Type 2 diabetes mellitus without complication, without long-term current use of insulin ([facility])  E11.9 250.00 HEMOGLOBIN A1C WITH EAG      MICROALBUMIN, UR, RAND W/ MICROALB/CREAT RATIO       DIABETES FOOT EXAM      metFORMIN ER (GLUCOPHAGE XR) 500 mg tablet      REFERRAL TO PODIATRY      HEMOGLOBIN A1C WITH EAG MICROALBUMIN, UR, RAND W/ MICROALB/CREAT RATIO   3. Essential hypertension  E88 778.2 METABOLIC PANEL, COMPREHENSIVE      amLODIPine (NORVASC) 10 mg tablet      losartan (COZAAR) 50 mg tablet      METABOLIC PANEL, COMPREHENSIVE   4. Acquired hypothyroidism  E03.9 244.9 TSH 3RD GENERATION      levothyroxine (SYNTHROID) 75 mcg tablet      TSH 3RD GENERATION   5.  Hyperlipidemia, unspecified hyperlipidemia type  E78.5 272.4 pravastatin (PRAVACHOL) 20 mg tablet      LIPID PANEL      LIPID PANEL     Hold and restart Losartan to see if that is the cause of the cough  F/U in 3 w      Becky Gilbert MD

## 2022-06-18 LAB
ALBUMIN SERPL-MCNC: 4 G/DL (ref 3.5–5)
ALBUMIN/GLOB SERPL: 1.2 {RATIO} (ref 1.1–2.2)
ALP SERPL-CCNC: 91 U/L (ref 45–117)
ALT SERPL-CCNC: 16 U/L (ref 12–78)
ANION GAP SERPL CALC-SCNC: 7 MMOL/L (ref 5–15)
AST SERPL-CCNC: 14 U/L (ref 15–37)
BILIRUB SERPL-MCNC: 0.3 MG/DL (ref 0.2–1)
BUN SERPL-MCNC: 22 MG/DL (ref 6–20)
BUN/CREAT SERPL: 19 (ref 12–20)
CALCIUM SERPL-MCNC: 9.3 MG/DL (ref 8.5–10.1)
CHLORIDE SERPL-SCNC: 103 MMOL/L (ref 97–108)
CHOLEST SERPL-MCNC: 200 MG/DL
CO2 SERPL-SCNC: 28 MMOL/L (ref 21–32)
CREAT SERPL-MCNC: 1.17 MG/DL (ref 0.55–1.02)
CREAT UR-MCNC: 99.7 MG/DL
EST. AVERAGE GLUCOSE BLD GHB EST-MCNC: 131 MG/DL
GLOBULIN SER CALC-MCNC: 3.3 G/DL (ref 2–4)
GLUCOSE SERPL-MCNC: 140 MG/DL (ref 65–100)
HBA1C MFR BLD: 6.2 % (ref 4–5.6)
HDLC SERPL-MCNC: 102 MG/DL
HDLC SERPL: 2 {RATIO} (ref 0–5)
LDLC SERPL CALC-MCNC: 79.2 MG/DL (ref 0–100)
MICROALBUMIN UR-MCNC: 2.98 MG/DL
MICROALBUMIN/CREAT UR-RTO: 30 MG/G (ref 0–30)
POTASSIUM SERPL-SCNC: 5.3 MMOL/L (ref 3.5–5.1)
PROT SERPL-MCNC: 7.3 G/DL (ref 6.4–8.2)
SODIUM SERPL-SCNC: 138 MMOL/L (ref 136–145)
TRIGL SERPL-MCNC: 94 MG/DL (ref ?–150)
TSH SERPL DL<=0.05 MIU/L-ACNC: 0.3 UIU/ML (ref 0.36–3.74)
VLDLC SERPL CALC-MCNC: 18.8 MG/DL

## 2022-06-20 NOTE — PROGRESS NOTES
Call pt and daughter  The HbA1C is 6.2, which is good, still in the prediabetes, range, but cont current medication  The urine test is normal  The thyroid test is a little low, decrease the dose to one a day except on Sundays take only half a tablet and recheck in 3 mo  The Potassium and kidney tests are a little high, increase fluids and avoid Aleve or Advil and recheck in 3 mo  The liver tests are normal  The Chol is good

## 2022-06-21 ENCOUNTER — TELEPHONE (OUTPATIENT)
Dept: FAMILY MEDICINE CLINIC | Age: 87
End: 2022-06-21

## 2022-06-21 NOTE — TELEPHONE ENCOUNTER
Call from patient's daughter. States she is returning a call from [address] [place] [address] regarding blood work that was old and the need for it to be re-drawn, but there is no documentation of this. Please call her back.

## 2022-06-27 ENCOUNTER — TELEPHONE (OUTPATIENT)
Dept: FAMILY MEDICINE CLINIC | Age: 87
End: 2022-06-27

## 2022-06-27 NOTE — TELEPHONE ENCOUNTER
Patient's daughter was told to hold losartan for 5 days due to a reaction, but she is still experiencing a cough. Please advise.  [phone]

## 2022-06-27 NOTE — TELEPHONE ENCOUNTER
I have called and talked to this patient's daughter Robb Good. I have given her the message from Dr Rashad Gonzalez. She will restart the Losartan and take her mom for the CXR.

## 2022-06-29 ENCOUNTER — HOSPITAL ENCOUNTER (OUTPATIENT)
Dept: GENERAL RADIOLOGY | Age: 87
Discharge: HOME | End: 2022-06-29
Payer: MEDICARE

## 2022-06-29 DIAGNOSIS — R05.9 COUGH: ICD-10-CM

## 2022-06-29 PROCEDURE — 71046 X-RAY EXAM CHEST 2 VIEWS: CPT

## 2022-06-29 NOTE — PROGRESS NOTES
I have called and talked to this patient's daughter and left her know the CXR results per Dr Yanet Ridley review. Patient's daughter verbalizes understanding. She asks if the patient should still take the Losartan, since that can cause a cough?

## 2022-07-01 DIAGNOSIS — I10 ESSENTIAL HYPERTENSION: Primary | ICD-10-CM

## 2022-07-01 RX ORDER — HYDROCHLOROTHIAZIDE 12.5 MG/1
12.5 TABLET ORAL DAILY
Qty: 30 TABLET | Refills: 0 | Status: SHIPPED | OUTPATIENT
Start: 2022-07-01 | End: 2022-07-24

## 2022-07-01 NOTE — PROGRESS NOTES
I have and talked to this patient's daughter. She reports that her mom did stop the Losartan for 5 days and it did stop the cough. She went back on it 3 or 4 days ago and the cough has come back. She reports that the patient is still taking the medication. How do you advise?

## 2022-07-01 NOTE — PROGRESS NOTES
I have called and left a detailed message on this patient's daughter's personalized VMB. Stop losartan, start the new medication sent to the pharmacy and make 1 month apt for follow up.

## 2022-07-24 DIAGNOSIS — I10 ESSENTIAL HYPERTENSION: ICD-10-CM

## 2022-07-24 RX ORDER — HYDROCHLOROTHIAZIDE 12.5 MG/1
TABLET ORAL
Qty: 30 TABLET | Refills: 0 | Status: SHIPPED | OUTPATIENT
Start: 2022-07-24 | End: 2022-08-08 | Stop reason: SDUPTHER

## 2022-08-08 ENCOUNTER — OFFICE VISIT (OUTPATIENT)
Dept: FAMILY MEDICINE CLINIC | Age: 87
End: 2022-08-08
Payer: MEDICARE

## 2022-08-08 VITALS
SYSTOLIC BLOOD PRESSURE: 119 MMHG | HEIGHT: 56 IN | OXYGEN SATURATION: 96 % | HEART RATE: 66 BPM | TEMPERATURE: 96.6 F | DIASTOLIC BLOOD PRESSURE: 66 MMHG | RESPIRATION RATE: 16 BRPM | BODY MASS INDEX: 25.56 KG/M2

## 2022-08-08 DIAGNOSIS — I10 ESSENTIAL HYPERTENSION: ICD-10-CM

## 2022-08-08 DIAGNOSIS — Z00.00 MEDICARE ANNUAL WELLNESS VISIT, SUBSEQUENT: Primary | ICD-10-CM

## 2022-08-08 PROCEDURE — G0439 PPPS, SUBSEQ VISIT: HCPCS | Performed by: FAMILY MEDICINE

## 2022-08-08 PROCEDURE — 99213 OFFICE O/P EST LOW 20 MIN: CPT | Performed by: FAMILY MEDICINE

## 2022-08-08 RX ORDER — HYDROCHLOROTHIAZIDE 12.5 MG/1
12.5 TABLET ORAL DAILY
Qty: 90 TABLET | Refills: 1 | Status: SHIPPED | OUTPATIENT
Start: 2022-08-08

## 2022-08-08 NOTE — PROGRESS NOTES
Lexi Saucedo is a 80 y.o. female who presents to the office today with the following:  No chief complaint on file. HPI  HTN  Losartan was stopped d/t cough  CXR was normal  Cough is better since off the medication    Pt was started on HCTZ 12.5 mg  Here for recheck  No SE with HCTZ  No BP checks at home    Drinking fluids    Last TSH abnl  Due for recheck in 1 mo    Review of Systems   Respiratory:  Positive for cough (little). Negative for shortness of breath. Cardiovascular:  Negative for chest pain and palpitations. Gastrointestinal:  Negative for abdominal pain. Genitourinary: Negative. See HPI. Past Medical History:   Diagnosis Date    Diabetes ([facility]     Glaucoma     Hypercholesterolemia     Hypertension     Incontinence of urine in female     Thyroid disease        Past Surgical History:   Procedure Laterality Date    HX CATARACT REMOVAL Bilateral 2018       No Known Allergies    Current Outpatient Medications   Medication Sig    hydroCHLOROthiazide (HYDRODIURIL) 12.5 mg tablet Take 1 Tablet by mouth in the morning. latanoprost (XALATAN) 0.005 % ophthalmic solution APPLY ONE DROP TO BOTH EYES ONCE A DAY. metFORMIN ER (GLUCOPHAGE XR) 500 mg tablet Take 1 Tablet by mouth daily (with dinner). pravastatin (PRAVACHOL) 20 mg tablet Take 1 Tablet by mouth nightly. amLODIPine (NORVASC) 10 mg tablet Take 1 Tablet by mouth daily. levothyroxine (SYNTHROID) 75 mcg tablet Take 1 Tablet by mouth daily. brimonidine (ALPHAGAN) 0.15 % ophthalmic solution Administer 1 Drop to both eyes three (3) times daily. (Patient not taking: Reported on 6/16/2022)    calcium-cholecalciferol, D3, (CALTRATE 600+D) tablet Take 1 Tablet by mouth daily. No current facility-administered medications for this visit.        Social History     Socioeconomic History    Marital status:    Tobacco Use    Smoking status: Never    Smokeless tobacco: Never   Vaping Use    Vaping Use: Never used   Substance and Sexual Activity    Alcohol use: Never    Drug use: Never    Sexual activity: Not Currently     Partners: Male       No family history on file. Physical Exam:  Visit Vitals  /66 (BP 1 Location: Left upper arm, BP Patient Position: Sitting, BP Cuff Size: Adult)   Pulse 66   Temp (!) 96.6 °F (35.9 °C) (Temporal)   Resp 16   Ht 4' 8\" (1.422 m)   SpO2 96%   BMI 25.56 kg/m²     Physical Exam  Vitals and nursing note reviewed. HENT:      Head: Normocephalic and atraumatic. Eyes:      Extraocular Movements: Extraocular movements intact. Conjunctiva/sclera: Conjunctivae normal.   Cardiovascular:      Rate and Rhythm: Normal rate and regular rhythm. Heart sounds: Normal heart sounds. Pulmonary:      Effort: Pulmonary effort is normal.      Breath sounds: Normal breath sounds. Abdominal:      General: There is no distension. Palpations: Abdomen is soft. Tenderness: There is no abdominal tenderness. Musculoskeletal:      Right lower leg: No edema. Left lower leg: No edema. Skin:     General: Skin is dry. Neurological:      Mental Status: She is alert and oriented to person, place, and time. Psychiatric:         Mood and Affect: Mood normal.         Behavior: Behavior normal.       Assessment/Plan:    ICD-10-CM ICD-9-CM    1. Medicare annual wellness visit, subsequent  Z00.00 V70.0       2. Essential hypertension  I10 401.9 hydroCHLOROthiazide (HYDRODIURIL) 12.5 mg tablet          Follow-up and Dispositions    Return in about 6 weeks (around 9/19/2022).          Jeferson Kenney MD

## 2022-08-08 NOTE — PROGRESS NOTES
This is the Subsequent Medicare Annual Wellness Exam, performed 12 months or more after the Initial AWV or the last Subsequent AWV    I have reviewed the patient's medical history in detail and updated the computerized patient record. Assessment/Plan   Education and counseling provided:  Are appropriate based on today's review and evaluation    1. Medicare annual wellness visit, subsequent  2. Essential hypertension  The following orders have not been finalized:  -     hydroCHLOROthiazide (HYDRODIURIL) 12.5 mg tablet       Depression Risk Factor Screening     3 most recent PHQ Screens 8/8/2022   Little interest or pleasure in doing things Not at all   Feeling down, depressed, irritable, or hopeless Not at all   Total Score PHQ 2 0       Alcohol & Drug Abuse Risk Screen    Do you average more than 1 drink per night or more than 7 drinks a week:  No    On any one occasion in the past three months have you have had more than 3 drinks containing alcohol:  No          Functional Ability and Level of Safety    Hearing: The patient needs further evaluation. Activities of Daily Living: The home contains: no safety equipment. Patient does total self care      Ambulation: with no difficulty     Fall Risk:  Fall Risk Assessment, last 12 mths 8/8/2022   Able to walk? Yes   Fall in past 12 months? 0   Do you feel unsteady?  0   Are you worried about falling 0      Abuse Screen:  Patient is not abused       Cognitive Screening    Has your family/caregiver stated any concerns about your memory: yes - Worried about short term memory     Cognitive Screening: N/A    Health Maintenance Due     Health Maintenance Due   Topic Date Due    Eye Exam Retinal or Dilated  Never done    DTaP/Tdap/Td series (1 - Tdap) Never done    Shingrix Vaccine Age 50> (1 of 2) Never done    COVID-19 Vaccine (3 - Booster for Edgar Deist series) 12/18/2021       Patient Care Team   Patient Care Team:  Viri Fonseca MD as PCP - General (Family Medicine)  Eduard Cates MD as PCP - [facility] Provider    History     Patient Active Problem List   Diagnosis Code    Type 2 diabetes mellitus E11.9     Past Medical History:   Diagnosis Date    Diabetes ([address]     Glaucoma     Hypercholesterolemia     Hypertension     Incontinence of urine in female     Thyroid disease       Past Surgical History:   Procedure Laterality Date    HX CATARACT REMOVAL Bilateral 2018     Current Outpatient Medications   Medication Sig Dispense Refill    hydroCHLOROthiazide (HYDRODIURIL) 12.5 mg tablet TAKE 1 TABLET BY MOUTH EVERY DAY 30 Tablet 0    latanoprost (XALATAN) 0.005 % ophthalmic solution APPLY ONE DROP TO BOTH EYES ONCE A DAY. metFORMIN ER (GLUCOPHAGE XR) 500 mg tablet Take 1 Tablet by mouth daily (with dinner). 90 Tablet 1    pravastatin (PRAVACHOL) 20 mg tablet Take 1 Tablet by mouth nightly. 90 Tablet 1    amLODIPine (NORVASC) 10 mg tablet Take 1 Tablet by mouth daily. 90 Tablet 1    levothyroxine (SYNTHROID) 75 mcg tablet Take 1 Tablet by mouth daily. 90 Tablet 1    brimonidine (ALPHAGAN) 0.15 % ophthalmic solution Administer 1 Drop to both eyes three (3) times daily. (Patient not taking: Reported on 6/16/2022)      calcium-cholecalciferol, D3, (CALTRATE 600+D) tablet Take 1 Tablet by mouth daily. No Known Allergies    No family history on file. Social History     Tobacco Use    Smoking status: Never    Smokeless tobacco: Never   Substance Use Topics    Alcohol use: Never         Functional Ability:   Does the patient exhibit a steady gait? yes   How long did it take the patient to get up and walk from a sitting position? 4 seconds   Is the patient self reliant?  (ie can do own laundry, meals, household chores)  yes     Does the patient handle his/her own medications? yes     Does the patient handle his/her own money? yes     Is the patients home safe (ie good lighting, handrails on stairs and bath, etc.)?    yes     Did you notice or did patient express any hearing difficulties? no     Did you notice or did patient express any vision difficulties?     no     Were distance and reading eye charts used? no       Advance Care Planning:   Patient was offered the opportunity to discuss advance care planning:  yes     Does patient have an Advance Directive:  no   If no, did you provide information on Caring Connections? yes     ADL Assessment 1/18/2022   Feeding yourself No Help Needed   Getting from bed to chair No Help Needed   Getting dressed No Help Needed   Bathing or showering No Help Needed   Walk across the room (includes cane/walker) No Help Needed   Using the telphone No Help Needed   Taking your medications No Help Needed   Preparing meals No Help Needed   Managing money (expenses/bills) No Help Needed   Moderately strenuous housework (laundry) No Help Needed   Shopping for personal items (toiletries/medicines) Help Needed   Shopping for groceries Help Needed   Driving Help Needed   Climbing a flight of stairs Help Needed   Getting to places beyond walking distances Help Needed       Abuse Screening Questionnaire 6/14/2021   Do you ever feel afraid of your partner? N   Are you in a relationship with someone who physically or mentally threatens you? N   Is it safe for you to go home? Y       1. \"Have you been to the ER, urgent care clinic since your last visit? Hospitalized since your last visit? \" No    2. \"Have you seen or consulted any other health care providers outside of the [address] [place] [address] since your last visit? \" No     3. For patients aged 39-70: Has the patient had a colonoscopy / FIT/ Cologuard? NA - based on age      If the patient is female:    4. For patients aged 41-77: Has the patient had a mammogram within the past 2 years? NA - based on age or sex      11. For patients aged 21-65: Has the patient had a pap smear?  NA - based on age or sex      Kami Romeo RN

## 2022-08-08 NOTE — PATIENT INSTRUCTIONS

## 2022-09-19 ENCOUNTER — OFFICE VISIT (OUTPATIENT)
Dept: FAMILY MEDICINE CLINIC | Age: 87
End: 2022-09-19
Payer: MEDICARE

## 2022-09-19 VITALS
HEIGHT: 60 IN | DIASTOLIC BLOOD PRESSURE: 63 MMHG | OXYGEN SATURATION: 97 % | SYSTOLIC BLOOD PRESSURE: 140 MMHG | WEIGHT: 110 LBS | TEMPERATURE: 96.6 F | BODY MASS INDEX: 21.6 KG/M2 | HEART RATE: 62 BPM

## 2022-09-19 DIAGNOSIS — E03.9 ACQUIRED HYPOTHYROIDISM: Primary | ICD-10-CM

## 2022-09-19 DIAGNOSIS — I10 ESSENTIAL HYPERTENSION: ICD-10-CM

## 2022-09-19 DIAGNOSIS — H91.90 HEARING LOSS, UNSPECIFIED HEARING LOSS TYPE, UNSPECIFIED LATERALITY: ICD-10-CM

## 2022-09-19 DIAGNOSIS — Z23 ENCOUNTER FOR IMMUNIZATION: ICD-10-CM

## 2022-09-19 PROCEDURE — 90694 VACC AIIV4 NO PRSRV 0.5ML IM: CPT | Performed by: FAMILY MEDICINE

## 2022-09-19 PROCEDURE — 36415 COLL VENOUS BLD VENIPUNCTURE: CPT | Performed by: FAMILY MEDICINE

## 2022-09-19 PROCEDURE — 99214 OFFICE O/P EST MOD 30 MIN: CPT | Performed by: FAMILY MEDICINE

## 2022-09-19 PROCEDURE — G0008 ADMIN INFLUENZA VIRUS VAC: HCPCS | Performed by: FAMILY MEDICINE

## 2022-09-19 NOTE — PROGRESS NOTES
1. \"Have you been to the ER, urgent care clinic since your last visit? Hospitalized since your last visit? \" No    2. \"Have you seen or consulted any other health care providers outside of the [address] [place] [address] since your last visit? \" No     3. For patients aged 39-70: Has the patient had a colonoscopy / FIT/ Cologuard? NA - based on age      If the patient is female:    4. For patients aged 41-77: Has the patient had a mammogram within the past 2 years? NA - based on age or sex      11. For patients aged 21-65: Has the patient had a pap smear?  NA - based on age or sex

## 2022-09-19 NOTE — PROGRESS NOTES
Loyd Son is a 80 y.o. female who presents to the office today with the following:  No chief complaint on file. HPI  Hypothyroid  Taking one tablet every day except half on Sundays    HTN  Last Potassium up  And Creatinine up  BP at home 120/64    [facility] requesting hearing test  Started a month ago  Pt states she can hear    She also requesting to have Advance care directive signed  Pt oriented x3  But wishes written are different than pt is expressing      Review of Systems   Constitutional:  Negative for weight loss. Respiratory:  Positive for cough (occasionally). Negative for sputum production and shortness of breath. Cardiovascular:  Negative for chest pain. Gastrointestinal:  Negative for constipation and diarrhea. Musculoskeletal:  Negative for myalgias. Skin:         No hair changes   Psychiatric/Behavioral:  Negative for depression. The patient is not nervous/anxious. See HPI. Past Medical History:   Diagnosis Date    Diabetes ([address]     Glaucoma     Hypercholesterolemia     Hypertension     Incontinence of urine in female     Thyroid disease        Past Surgical History:   Procedure Laterality Date    HX CATARACT REMOVAL Bilateral 2018       No Known Allergies    Current Outpatient Medications   Medication Sig    latanoprost (XALATAN) 0.005 % ophthalmic solution APPLY ONE DROP TO BOTH EYES ONCE A DAY. metFORMIN ER (GLUCOPHAGE XR) 500 mg tablet Take 1 Tablet by mouth daily (with dinner). pravastatin (PRAVACHOL) 20 mg tablet Take 1 Tablet by mouth nightly. amLODIPine (NORVASC) 10 mg tablet Take 1 Tablet by mouth daily. levothyroxine (SYNTHROID) 75 mcg tablet Take 1 Tablet by mouth daily. calcium-cholecalciferol, D3, (CALTRATE 600+D) tablet Take 1 Tablet by mouth daily. hydroCHLOROthiazide (HYDRODIURIL) 12.5 mg tablet Take 1 Tablet by mouth in the morning. brimonidine (ALPHAGAN) 0.15 % ophthalmic solution Administer 1 Drop to both eyes three (3) times daily. (Patient not taking: No sig reported)     No current facility-administered medications for this visit. Social History     Socioeconomic History    Marital status:    Tobacco Use    Smoking status: Never    Smokeless tobacco: Never   Vaping Use    Vaping Use: Never used   Substance and Sexual Activity    Alcohol use: Never    Drug use: Never    Sexual activity: Not Currently     Partners: Male       No family history on file. Physical Exam:  Visit Vitals  BP (!) 140/63 (BP 1 Location: Right upper arm, BP Patient Position: Sitting, BP Cuff Size: Adult)   Pulse 62   Temp (!) 96.6 °F (35.9 °C)   Ht 5' (1.524 m)   Wt 110 lb (49.9 kg)   SpO2 97%   BMI 21.48 kg/m²     Physical Exam  Vitals and nursing note reviewed. HENT:      Head: Normocephalic and atraumatic. Right Ear: Tympanic membrane, ear canal and external ear normal.      Left Ear: Tympanic membrane, ear canal and external ear normal.   Eyes:      Extraocular Movements: Extraocular movements intact. Conjunctiva/sclera: Conjunctivae normal.   Cardiovascular:      Rate and Rhythm: Normal rate and regular rhythm. Heart sounds: Normal heart sounds. Pulmonary:      Effort: Pulmonary effort is normal.      Breath sounds: Normal breath sounds. Musculoskeletal:      Right lower leg: No edema. Left lower leg: No edema. Lymphadenopathy:      Cervical: No cervical adenopathy. Skin:     General: Skin is warm and dry. Neurological:      Mental Status: She is alert and oriented to person, place, and time. Psychiatric:         Mood and Affect: Mood normal.         Behavior: Behavior normal.       Assessment/Plan:    ICD-10-CM ICD-9-CM    1. Acquired hypothyroidism  E03.9 244.9 TSH 3RD GENERATION      TSH 3RD GENERATION      2. Essential hypertension  K38 091.7 METABOLIC PANEL, BASIC      METABOLIC PANEL, BASIC      MD HANDLG&/OR CONVEY OF SPEC FOR TR OFFICE TO LAB      COLLECTION VENOUS BLOOD,VENIPUNCTURE      3.  Hearing loss, unspecified hearing loss type, unspecified laterality  H91.90 389.9 REFERRAL TO AUDIOLOGY      4. Encounter for immunization  Z23 V03.89 INFLUENZA, FLUAD, (AGE 72 Y+), IM, PF, 0.5 ML        Pt wishes to be full code at this time      Josias Willis MD

## 2022-09-20 LAB
ANION GAP SERPL CALC-SCNC: 8 MMOL/L (ref 5–15)
BUN SERPL-MCNC: 20 MG/DL (ref 6–20)
BUN/CREAT SERPL: 23 (ref 12–20)
CALCIUM SERPL-MCNC: 10.6 MG/DL (ref 8.5–10.1)
CHLORIDE SERPL-SCNC: 102 MMOL/L (ref 97–108)
CO2 SERPL-SCNC: 31 MMOL/L (ref 21–32)
CREAT SERPL-MCNC: 0.87 MG/DL (ref 0.55–1.02)
GLUCOSE SERPL-MCNC: 89 MG/DL (ref 65–100)
POTASSIUM SERPL-SCNC: 3.3 MMOL/L (ref 3.5–5.1)
SODIUM SERPL-SCNC: 141 MMOL/L (ref 136–145)
TSH SERPL DL<=0.05 MIU/L-ACNC: 0.68 UIU/ML (ref 0.36–3.74)

## 2022-09-21 NOTE — PROGRESS NOTES
Spoke with patient's daughter after verifying HIPPA, Name, and , informed patient of lab results and recommendations per Dr. Maeve Cruz . Daughter given an opportunity to ask questions, repeated information, and verbalized understanding.

## 2022-09-21 NOTE — PROGRESS NOTES
Call pt and daughter,   the Potassium is a little low, eat a Banana every day  The kidney tests and Glucose are normal  The Thyroid test is normal  Recheck in 6 mo

## 2022-12-14 DIAGNOSIS — E11.9 TYPE 2 DIABETES MELLITUS WITHOUT COMPLICATION, WITHOUT LONG-TERM CURRENT USE OF INSULIN (HCC): ICD-10-CM

## 2022-12-15 RX ORDER — METFORMIN HYDROCHLORIDE 500 MG/1
TABLET, EXTENDED RELEASE ORAL
Qty: 30 TABLET | Refills: 0 | Status: SHIPPED | OUTPATIENT
Start: 2022-12-15

## 2023-01-10 ENCOUNTER — OFFICE VISIT (OUTPATIENT)
Dept: FAMILY MEDICINE CLINIC | Age: 88
End: 2023-01-10
Payer: MEDICARE

## 2023-01-10 VITALS
WEIGHT: 112.6 LBS | HEART RATE: 79 BPM | SYSTOLIC BLOOD PRESSURE: 128 MMHG | OXYGEN SATURATION: 98 % | HEIGHT: 58 IN | DIASTOLIC BLOOD PRESSURE: 52 MMHG | RESPIRATION RATE: 16 BRPM | BODY MASS INDEX: 23.63 KG/M2 | TEMPERATURE: 98.3 F

## 2023-01-10 DIAGNOSIS — E03.9 ACQUIRED HYPOTHYROIDISM: ICD-10-CM

## 2023-01-10 DIAGNOSIS — E11.9 TYPE 2 DIABETES MELLITUS WITHOUT COMPLICATION, WITHOUT LONG-TERM CURRENT USE OF INSULIN (HCC): Primary | ICD-10-CM

## 2023-01-10 DIAGNOSIS — I10 ESSENTIAL HYPERTENSION: ICD-10-CM

## 2023-01-10 DIAGNOSIS — E78.5 HYPERLIPIDEMIA, UNSPECIFIED HYPERLIPIDEMIA TYPE: ICD-10-CM

## 2023-01-10 PROCEDURE — 99214 OFFICE O/P EST MOD 30 MIN: CPT | Performed by: FAMILY MEDICINE

## 2023-01-10 PROCEDURE — 1123F ACP DISCUSS/DSCN MKR DOCD: CPT | Performed by: FAMILY MEDICINE

## 2023-01-10 PROCEDURE — G8427 DOCREV CUR MEDS BY ELIG CLIN: HCPCS | Performed by: FAMILY MEDICINE

## 2023-01-10 PROCEDURE — G8432 DEP SCR NOT DOC, RNG: HCPCS | Performed by: FAMILY MEDICINE

## 2023-01-10 PROCEDURE — 1090F PRES/ABSN URINE INCON ASSESS: CPT | Performed by: FAMILY MEDICINE

## 2023-01-10 PROCEDURE — 36415 COLL VENOUS BLD VENIPUNCTURE: CPT | Performed by: FAMILY MEDICINE

## 2023-01-10 PROCEDURE — G8420 CALC BMI NORM PARAMETERS: HCPCS | Performed by: FAMILY MEDICINE

## 2023-01-10 PROCEDURE — G8536 NO DOC ELDER MAL SCRN: HCPCS | Performed by: FAMILY MEDICINE

## 2023-01-10 PROCEDURE — 1101F PT FALLS ASSESS-DOCD LE1/YR: CPT | Performed by: FAMILY MEDICINE

## 2023-01-10 RX ORDER — HYDROCHLOROTHIAZIDE 12.5 MG/1
12.5 TABLET ORAL DAILY
Qty: 90 TABLET | Refills: 1 | Status: SHIPPED | OUTPATIENT
Start: 2023-01-10

## 2023-01-10 RX ORDER — LEVOTHYROXINE SODIUM 75 UG/1
75 TABLET ORAL DAILY
Qty: 90 TABLET | Refills: 1 | Status: SHIPPED | OUTPATIENT
Start: 2023-01-10 | End: 2023-01-12

## 2023-01-10 RX ORDER — AMLODIPINE BESYLATE 10 MG/1
10 TABLET ORAL DAILY
Qty: 90 TABLET | Refills: 1 | Status: SHIPPED | OUTPATIENT
Start: 2023-01-10

## 2023-01-10 RX ORDER — METFORMIN HYDROCHLORIDE 500 MG/1
TABLET, EXTENDED RELEASE ORAL
Qty: 90 TABLET | Refills: 1 | Status: SHIPPED | OUTPATIENT
Start: 2023-01-10

## 2023-01-10 RX ORDER — PRAVASTATIN SODIUM 20 MG/1
20 TABLET ORAL
Qty: 90 TABLET | Refills: 1 | Status: SHIPPED | OUTPATIENT
Start: 2023-01-10

## 2023-01-10 NOTE — PROGRESS NOTES
1. \"Have you been to the ER, urgent care clinic since your last visit? Hospitalized since your last visit? \" No    2. \"Have you seen or consulted any other health care providers outside of the [address] [place] [address] since your last visit? \" Yes Where: Dr Jeremy Todd      3. For patients aged 39-70: Has the patient had a colonoscopy / FIT/ Cologuard? NA - based on age      If the patient is female:    4. For patients aged 41-77: Has the patient had a mammogram within the past 2 years? NA - based on age or sex      11. For patients aged 21-65: Has the patient had a pap smear?  NA - based on age or sex

## 2023-01-10 NOTE — PROGRESS NOTES
Perla Johnson is a 80 y.o. female who presents to the office today with the following:  Chief Complaint   Patient presents with    Diabetes     Follow up         HPI  HTN  No SE with meds    Hypothyroid  Taking one a day      DM  Lab Results   Component Value Date/Time    Hemoglobin A1c 6.2 (H) 06/16/2022 05:50 PM    Hemoglobin A1c 5.9 (H) 01/10/2022 12:13 PM    Hemoglobin A1c 6.1 (H) 07/13/2021 09:34 PM    Hemoglobin A1c, External 6.4 02/19/2020 12:00 AM   No BS checks      Review of Systems   Constitutional:  Negative for weight loss. HENT:  Negative for congestion. Respiratory:  Negative for cough. Cardiovascular:  Negative for chest pain. Gastrointestinal:  Negative for blood in stool and melena. Neurological:  Negative for dizziness and headaches. See HPI. Past Medical History:   Diagnosis Date    Diabetes ([address]     Glaucoma     Hypercholesterolemia     Hypertension     Incontinence of urine in female     Thyroid disease        Past Surgical History:   Procedure Laterality Date    HX CATARACT REMOVAL Bilateral 2018       No Known Allergies    Current Outpatient Medications   Medication Sig    levothyroxine (SYNTHROID) 75 mcg tablet Take 1 Tablet by mouth daily. amLODIPine (NORVASC) 10 mg tablet Take 1 Tablet by mouth daily. pravastatin (PRAVACHOL) 20 mg tablet Take 1 Tablet by mouth nightly. hydroCHLOROthiazide (HYDRODIURIL) 12.5 mg tablet Take 1 Tablet by mouth daily. metFORMIN ER (GLUCOPHAGE XR) 500 mg tablet TAKE 1 TABLET BY MOUTH EVERY DAY WITH DINNER    latanoprost (XALATAN) 0.005 % ophthalmic solution APPLY ONE DROP TO BOTH EYES ONCE A DAY. calcium-cholecalciferol, D3, (CALTRATE 600+D) tablet Take 1 Tablet by mouth daily. No current facility-administered medications for this visit.        Social History     Socioeconomic History    Marital status:    Tobacco Use    Smoking status: Never    Smokeless tobacco: Never   Vaping Use    Vaping Use: Never used   Substance and Sexual Activity    Alcohol use: Never    Drug use: Never    Sexual activity: Not Currently     Partners: Male       No family history on file. Physical Exam:  Visit Vitals  BP (!) 128/52 (BP 1 Location: Right arm, BP Patient Position: Sitting, BP Cuff Size: Adult)   Pulse 79   Temp 98.3 °F (36.8 °C) (Temporal)   Resp 16   Ht 4' 10\" (1.473 m)   Wt 112 lb 9.6 oz (51.1 kg)   SpO2 98%   BMI 23.53 kg/m²     Physical Exam  Vitals and nursing note reviewed. Constitutional:       Appearance: She is normal weight. HENT:      Head: Normocephalic and atraumatic. Right Ear: Tympanic membrane, ear canal and external ear normal.      Left Ear: Tympanic membrane, ear canal and external ear normal.   Eyes:      Extraocular Movements: Extraocular movements intact. Conjunctiva/sclera: Conjunctivae normal.   Cardiovascular:      Rate and Rhythm: Normal rate and regular rhythm. Heart sounds: Normal heart sounds. Pulmonary:      Effort: Pulmonary effort is normal.      Breath sounds: Normal breath sounds. Abdominal:      General: Abdomen is flat. There is no distension. Palpations: Abdomen is soft. Tenderness: There is no abdominal tenderness. There is no right CVA tenderness, left CVA tenderness or guarding. Musculoskeletal:      Right lower leg: No edema. Left lower leg: No edema. Lymphadenopathy:      Cervical: No cervical adenopathy. Skin:     General: Skin is warm and dry. Neurological:      Mental Status: She is alert and oriented to person, place, and time. Psychiatric:         Mood and Affect: Mood normal.         Behavior: Behavior normal.       Assessment/Plan:    ICD-10-CM ICD-9-CM    1. Type 2 diabetes mellitus without complication, without long-term current use of insulin (HCC)  E11.9 250.00 HEMOGLOBIN A1C WITH EAG      metFORMIN ER (GLUCOPHAGE XR) 500 mg tablet      2.  Acquired hypothyroidism  E03.9 244.9 TSH 3RD GENERATION      levothyroxine (SYNTHROID) 75 mcg tablet      3. Essential hypertension  I10 401.9 CBC WITH AUTOMATED DIFF      METABOLIC PANEL, BASIC      amLODIPine (NORVASC) 10 mg tablet      hydroCHLOROthiazide (HYDRODIURIL) 12.5 mg tablet      4. Hyperlipidemia, unspecified hyperlipidemia type  E78.5 272.4 pravastatin (PRAVACHOL) 20 mg tablet      Cont current meds  Recheck in 6 mo sooner if any problems    Follow-up and Dispositions    Return in about 6 months (around 7/10/2023).          Janet Hernandez MD

## 2023-01-11 LAB
ANION GAP SERPL CALC-SCNC: 6 MMOL/L (ref 5–15)
BASOPHILS # BLD: 0 K/UL (ref 0–0.1)
BASOPHILS NFR BLD: 1 % (ref 0–1)
BUN SERPL-MCNC: 22 MG/DL (ref 6–20)
BUN/CREAT SERPL: 25 (ref 12–20)
CALCIUM SERPL-MCNC: 9.4 MG/DL (ref 8.5–10.1)
CHLORIDE SERPL-SCNC: 102 MMOL/L (ref 97–108)
CO2 SERPL-SCNC: 31 MMOL/L (ref 21–32)
CREAT SERPL-MCNC: 0.88 MG/DL (ref 0.55–1.02)
DIFFERENTIAL METHOD BLD: NORMAL
EOSINOPHIL # BLD: 0.2 K/UL (ref 0–0.4)
EOSINOPHIL NFR BLD: 3 % (ref 0–7)
ERYTHROCYTE [DISTWIDTH] IN BLOOD BY AUTOMATED COUNT: 12.5 % (ref 11.5–14.5)
EST. AVERAGE GLUCOSE BLD GHB EST-MCNC: 134 MG/DL
GLUCOSE SERPL-MCNC: 103 MG/DL (ref 65–100)
HBA1C MFR BLD: 6.3 % (ref 4–5.6)
HCT VFR BLD AUTO: 44.4 % (ref 35–47)
HGB BLD-MCNC: 14.1 G/DL (ref 11.5–16)
IMM GRANULOCYTES # BLD AUTO: 0 K/UL (ref 0–0.04)
IMM GRANULOCYTES NFR BLD AUTO: 0 % (ref 0–0.5)
LYMPHOCYTES # BLD: 1.6 K/UL (ref 0.8–3.5)
LYMPHOCYTES NFR BLD: 23 % (ref 12–49)
MCH RBC QN AUTO: 28.8 PG (ref 26–34)
MCHC RBC AUTO-ENTMCNC: 31.8 G/DL (ref 30–36.5)
MCV RBC AUTO: 90.8 FL (ref 80–99)
MONOCYTES # BLD: 0.7 K/UL (ref 0–1)
MONOCYTES NFR BLD: 11 % (ref 5–13)
NEUTS SEG # BLD: 4.2 K/UL (ref 1.8–8)
NEUTS SEG NFR BLD: 62 % (ref 32–75)
NRBC # BLD: 0 K/UL (ref 0–0.01)
NRBC BLD-RTO: 0 PER 100 WBC
PLATELET # BLD AUTO: 216 K/UL (ref 150–400)
PMV BLD AUTO: 10.5 FL (ref 8.9–12.9)
POTASSIUM SERPL-SCNC: 3.6 MMOL/L (ref 3.5–5.1)
RBC # BLD AUTO: 4.89 M/UL (ref 3.8–5.2)
SODIUM SERPL-SCNC: 139 MMOL/L (ref 136–145)
TSH SERPL DL<=0.05 MIU/L-ACNC: 5.52 UIU/ML (ref 0.36–3.74)
WBC # BLD AUTO: 6.7 K/UL (ref 3.6–11)

## 2023-01-12 DIAGNOSIS — E03.9 ACQUIRED HYPOTHYROIDISM: ICD-10-CM

## 2023-01-12 RX ORDER — LEVOTHYROXINE SODIUM 75 UG/1
75 TABLET ORAL DAILY
Qty: 100 TABLET | Refills: 0 | Status: SHIPPED | OUTPATIENT
Start: 2023-01-12

## 2023-01-12 NOTE — PROGRESS NOTES
Call pt and daughter  The thyroid is a little off  Increase the dose by taking one and half tablet one day a week, such as Sundays, all other days take one a day and recheck in 3 mo  The HbA1C is 6.3 which is still good  The kidney tests and electrolytes are good  The CBC is normal

## 2023-01-13 NOTE — PROGRESS NOTES
Patient's daughter returns my call. I have verified the patient's name and . I have discussed the lab results and recommendations from Dr Yohan Stock. Patient verbalizes understanding at this time.

## 2023-02-03 ENCOUNTER — TELEPHONE (OUTPATIENT)
Dept: FAMILY MEDICINE CLINIC | Age: 88
End: 2023-02-03

## 2023-02-03 NOTE — TELEPHONE ENCOUNTER
Rachel with [employer] [facility] calls to state that they are trying to get a ramp for pt's home. They need a written order or RX for this. It will need a diagnosis code and a reason she needs it, such as her unsteady gate and weakness due to Rhumatoid arthritis    Order or Rx can be faxed to [phone] to the attention of Rachel.

## 2023-03-02 ENCOUNTER — OFFICE VISIT (OUTPATIENT)
Dept: FAMILY MEDICINE CLINIC | Age: 88
End: 2023-03-02

## 2023-03-02 VITALS
RESPIRATION RATE: 12 BRPM | HEIGHT: 58 IN | BODY MASS INDEX: 23.3 KG/M2 | WEIGHT: 111 LBS | HEART RATE: 74 BPM | DIASTOLIC BLOOD PRESSURE: 68 MMHG | SYSTOLIC BLOOD PRESSURE: 126 MMHG | OXYGEN SATURATION: 97 % | TEMPERATURE: 98 F

## 2023-03-02 DIAGNOSIS — E03.9 ACQUIRED HYPOTHYROIDISM: Primary | ICD-10-CM

## 2023-03-02 NOTE — PROGRESS NOTES
1. \"Have you been to the ER, urgent care clinic since your last visit? Hospitalized since your last visit? \" No    2. \"Have you seen or consulted any other health care providers outside of the [address] [place] [address] since your last visit? \" No     3. For patients aged 39-70: Has the patient had a colonoscopy / FIT/ Cologuard? NA - based on age      If the patient is female:    4. For patients aged 41-77: Has the patient had a mammogram within the past 2 years? NA - based on age or sex      11. For patients aged 21-65: Has the patient had a pap smear?  NA - based on age or sex

## 2023-03-02 NOTE — PROGRESS NOTES
Yamilex Cavazos is a 80 y.o. female who presents to the office today with the following:  Chief Complaint   Patient presents with    Diabetes     Follow up         HPI  DM  Last HbA1C is 6.3  No BS now    Hypothyroid  Taking one and a half tablet once a week  All other days one tablet    HTN  No BP checks at home        Review of Systems   Constitutional:  Negative for weight loss. Cardiovascular:  Negative for palpitations. Gastrointestinal:  Negative for constipation and diarrhea. Psychiatric/Behavioral:  Negative for depression. The patient is not nervous/anxious. See HPI. Past Medical History:   Diagnosis Date    Diabetes ([address]     Glaucoma     Hypercholesterolemia     Hypertension     Incontinence of urine in female     Thyroid disease        Past Surgical History:   Procedure Laterality Date    HX CATARACT REMOVAL Bilateral 2018       No Known Allergies    Current Outpatient Medications   Medication Sig    levothyroxine (SYNTHROID) 75 mcg tablet Take 1 Tablet by mouth daily. Take one a day except on Sundays take one and a half tablet    amLODIPine (NORVASC) 10 mg tablet Take 1 Tablet by mouth daily. pravastatin (PRAVACHOL) 20 mg tablet Take 1 Tablet by mouth nightly. hydroCHLOROthiazide (HYDRODIURIL) 12.5 mg tablet Take 1 Tablet by mouth daily. metFORMIN ER (GLUCOPHAGE XR) 500 mg tablet TAKE 1 TABLET BY MOUTH EVERY DAY WITH DINNER    latanoprost (XALATAN) 0.005 % ophthalmic solution APPLY ONE DROP TO BOTH EYES ONCE A DAY. calcium-cholecalciferol, D3, (CALTRATE 600+D) tablet Take 1 Tablet by mouth daily. No current facility-administered medications for this visit.        Social History     Socioeconomic History    Marital status:    Tobacco Use    Smoking status: Never    Smokeless tobacco: Never   Vaping Use    Vaping Use: Never used   Substance and Sexual Activity    Alcohol use: Never    Drug use: Never    Sexual activity: Not Currently     Partners: Male     Social Determinants of Health     Financial Resource Strain: Low Risk     Difficulty of Paying Living Expenses: Not hard at all   Food Insecurity: No Food Insecurity    Worried About [address] in the Last Year: Never true    [address] in the Last Year: Never true   Transportation Needs: No Transportation Needs    Lack of Transportation (Medical): No    Lack of Transportation (Non-Medical): No   Housing Stability: Unknown    Unable to Pay for Housing in the Last Year: No    Unstable Housing in the Last Year: No       No family history on file. Physical Exam:  Visit Vitals  /68   Pulse 74   Temp 98 °F (36.7 °C)   Resp 12   Ht 4' 10\" (1.473 m)   Wt 111 lb (50.3 kg)   SpO2 97%   BMI 23.20 kg/m²     Physical Exam  Vitals and nursing note reviewed. Constitutional:       Appearance: She is normal weight. HENT:      Head: Normocephalic and atraumatic. Cardiovascular:      Rate and Rhythm: Normal rate and regular rhythm. Heart sounds: Normal heart sounds. Pulmonary:      Effort: Pulmonary effort is normal.      Breath sounds: Normal breath sounds. Musculoskeletal:      Right lower leg: No edema. Left lower leg: No edema. Neurological:      Mental Status: She is alert and oriented to person, place, and time. Psychiatric:         Mood and Affect: Mood normal.         Behavior: Behavior normal.       Assessment/Plan:    ICD-10-CM ICD-9-CM    1. Acquired hypothyroidism  E03.9 244.9 TSH 3RD GENERATION      TSH 3RD GENERATION          Follow-up and Dispositions    Return in about 19 weeks (around 7/13/2023).          Stefanie Shirley MD

## 2023-03-03 DIAGNOSIS — E03.9 ACQUIRED HYPOTHYROIDISM: ICD-10-CM

## 2023-03-03 LAB — TSH SERPL DL<=0.05 MIU/L-ACNC: 0.99 UIU/ML (ref 0.36–3.74)

## 2023-03-03 RX ORDER — LEVOTHYROXINE SODIUM 75 UG/1
75 TABLET ORAL DAILY
Qty: 100 TABLET | Refills: 0 | Status: SHIPPED | OUTPATIENT
Start: 2023-03-03

## 2023-06-13 DIAGNOSIS — E11.9 TYPE 2 DIABETES MELLITUS WITHOUT COMPLICATIONS (HCC): ICD-10-CM

## 2023-06-13 DIAGNOSIS — I10 ESSENTIAL (PRIMARY) HYPERTENSION: ICD-10-CM

## 2023-06-13 RX ORDER — HYDROCHLOROTHIAZIDE 12.5 MG/1
TABLET ORAL
Qty: 90 TABLET | Refills: 0 | Status: SHIPPED | OUTPATIENT
Start: 2023-06-13

## 2023-06-13 RX ORDER — METFORMIN HYDROCHLORIDE 500 MG/1
TABLET, EXTENDED RELEASE ORAL
Qty: 90 TABLET | Refills: 0 | Status: SHIPPED | OUTPATIENT
Start: 2023-06-13

## 2023-06-13 NOTE — TELEPHONE ENCOUNTER
Requested Prescriptions     Pending Prescriptions Disp Refills    hydroCHLOROthiazide (HYDRODIURIL) 12.5 MG tablet [Pharmacy Med Name: HYDROCHLOROTHIAZIDE 12.5 MG TB] 90 tablet 1     Sig: TAKE 1 TABLET BY MOUTH EVERY DAY    metFORMIN (GLUCOPHAGE-XR) 500 MG extended release tablet [Pharmacy Med Name: METFORMIN HCL  MG TABLET] 90 tablet 1     Sig: TAKE 1 TABLET BY MOUTH EVERY DAY WITH DINNER     Last seen:  3/2/23

## 2023-08-18 DIAGNOSIS — I10 ESSENTIAL (PRIMARY) HYPERTENSION: ICD-10-CM

## 2023-08-18 NOTE — TELEPHONE ENCOUNTER
Requested Prescriptions     Pending Prescriptions Disp Refills    hydroCHLOROthiazide (HYDRODIURIL) 12.5 MG tablet [Pharmacy Med Name: HYDROCHLOROTHIAZIDE 12.5 MG TB] 90 tablet 0     Sig: TAKE 1 TABLET BY MOUTH EVERY DAY     Last seen:  3/2/23    Last filled:  hydroCHLOROthiazide (HYDRODIURIL) 12.5 mg tablet 90 Tablet 1 1/10/2023

## 2023-08-19 RX ORDER — HYDROCHLOROTHIAZIDE 12.5 MG/1
TABLET ORAL
Qty: 30 TABLET | Refills: 0 | Status: SHIPPED | OUTPATIENT
Start: 2023-08-19 | End: 2023-08-31 | Stop reason: SDUPTHER

## 2023-08-31 ENCOUNTER — OFFICE VISIT (OUTPATIENT)
Dept: FAMILY MEDICINE CLINIC | Age: 88
End: 2023-08-31
Payer: MEDICARE

## 2023-08-31 VITALS
SYSTOLIC BLOOD PRESSURE: 129 MMHG | DIASTOLIC BLOOD PRESSURE: 75 MMHG | RESPIRATION RATE: 10 BRPM | BODY MASS INDEX: 22.04 KG/M2 | TEMPERATURE: 96.8 F | HEART RATE: 74 BPM | WEIGHT: 105 LBS | HEIGHT: 58 IN | OXYGEN SATURATION: 96 %

## 2023-08-31 DIAGNOSIS — Z00.00 MEDICARE ANNUAL WELLNESS VISIT, SUBSEQUENT: Primary | ICD-10-CM

## 2023-08-31 DIAGNOSIS — I10 ESSENTIAL (PRIMARY) HYPERTENSION: ICD-10-CM

## 2023-08-31 DIAGNOSIS — E87.6 HYPOKALEMIA: ICD-10-CM

## 2023-08-31 DIAGNOSIS — E78.5 HYPERLIPIDEMIA, UNSPECIFIED HYPERLIPIDEMIA TYPE: ICD-10-CM

## 2023-08-31 DIAGNOSIS — E11.9 TYPE 2 DIABETES MELLITUS WITHOUT COMPLICATION, WITHOUT LONG-TERM CURRENT USE OF INSULIN (HCC): ICD-10-CM

## 2023-08-31 DIAGNOSIS — E03.9 HYPOTHYROIDISM, UNSPECIFIED TYPE: ICD-10-CM

## 2023-08-31 PROCEDURE — G8427 DOCREV CUR MEDS BY ELIG CLIN: HCPCS | Performed by: FAMILY MEDICINE

## 2023-08-31 PROCEDURE — G0439 PPPS, SUBSEQ VISIT: HCPCS | Performed by: FAMILY MEDICINE

## 2023-08-31 PROCEDURE — 1123F ACP DISCUSS/DSCN MKR DOCD: CPT | Performed by: FAMILY MEDICINE

## 2023-08-31 PROCEDURE — 1036F TOBACCO NON-USER: CPT | Performed by: FAMILY MEDICINE

## 2023-08-31 PROCEDURE — 3044F HG A1C LEVEL LT 7.0%: CPT | Performed by: FAMILY MEDICINE

## 2023-08-31 PROCEDURE — 36415 COLL VENOUS BLD VENIPUNCTURE: CPT | Performed by: FAMILY MEDICINE

## 2023-08-31 PROCEDURE — 99213 OFFICE O/P EST LOW 20 MIN: CPT | Performed by: FAMILY MEDICINE

## 2023-08-31 PROCEDURE — G8420 CALC BMI NORM PARAMETERS: HCPCS | Performed by: FAMILY MEDICINE

## 2023-08-31 PROCEDURE — 1090F PRES/ABSN URINE INCON ASSESS: CPT | Performed by: FAMILY MEDICINE

## 2023-08-31 RX ORDER — HYDROCHLOROTHIAZIDE 12.5 MG/1
12.5 TABLET ORAL DAILY
Qty: 90 TABLET | Refills: 1 | Status: SHIPPED | OUTPATIENT
Start: 2023-08-31

## 2023-08-31 RX ORDER — METFORMIN HYDROCHLORIDE 500 MG/1
500 TABLET, EXTENDED RELEASE ORAL
Qty: 90 TABLET | Refills: 1 | Status: SHIPPED | OUTPATIENT
Start: 2023-08-31

## 2023-08-31 ASSESSMENT — ENCOUNTER SYMPTOMS
COUGH: 0
SHORTNESS OF BREATH: 0
GASTROINTESTINAL NEGATIVE: 1

## 2023-08-31 ASSESSMENT — PATIENT HEALTH QUESTIONNAIRE - PHQ9
SUM OF ALL RESPONSES TO PHQ QUESTIONS 1-9: 0
SUM OF ALL RESPONSES TO PHQ QUESTIONS 1-9: 0
SUM OF ALL RESPONSES TO PHQ9 QUESTIONS 1 & 2: 0
SUM OF ALL RESPONSES TO PHQ QUESTIONS 1-9: 0
2. FEELING DOWN, DEPRESSED OR HOPELESS: 0
1. LITTLE INTEREST OR PLEASURE IN DOING THINGS: 0
SUM OF ALL RESPONSES TO PHQ QUESTIONS 1-9: 0

## 2023-08-31 ASSESSMENT — LIFESTYLE VARIABLES
HOW MANY STANDARD DRINKS CONTAINING ALCOHOL DO YOU HAVE ON A TYPICAL DAY: PATIENT DOES NOT DRINK
HOW OFTEN DO YOU HAVE A DRINK CONTAINING ALCOHOL: NEVER

## 2023-08-31 NOTE — PROGRESS NOTES
Francoise Ding is a 80 y.o. female who presents to the office today with the following:  Chief Complaint   Patient presents with    Medicare AWV            HPI  HM reviewed    DM  Last HbA1C 6.3  No BS checks  On Metformin one a day  Eats small portions    HTN  BP is good  Needs refills  No SE with meds    Hyperlipidemia  No SE    Hypothyroid      Review of Systems   Constitutional:  Negative for unexpected weight change. HENT:  Negative for congestion. Respiratory:  Negative for cough and shortness of breath. Cardiovascular:  Negative for chest pain and leg swelling. Gastrointestinal: Negative. Genitourinary: Negative. Neurological:  Negative for dizziness and headaches. See HPI. Past Medical History:   Diagnosis Date    Diabetes ([address])     Glaucoma     Hypercholesterolemia     Hypertension     Incontinence of urine in female     Thyroid disease        Past Surgical History:   Procedure Laterality Date    CATARACT REMOVAL Bilateral 2018       No Known Allergies    Current Outpatient Medications   Medication Sig Dispense Refill    hydroCHLOROthiazide (HYDRODIURIL) 12.5 MG tablet Take 1 tablet by mouth daily 90 tablet 1    metFORMIN (GLUCOPHAGE-XR) 500 MG extended release tablet Take 1 tablet by mouth daily (with breakfast) 90 tablet 1    amLODIPine (NORVASC) 10 MG tablet Take 1 tablet by mouth daily      calcium carb-cholecalciferol 600-10 MG-MCG TABS per tab Take 1 tablet by mouth daily      latanoprost (XALATAN) 0.005 % ophthalmic solution APPLY ONE DROP TO BOTH EYES ONCE A DAY. levothyroxine (SYNTHROID) 75 MCG tablet Take 1 tablet by mouth daily      pravastatin (PRAVACHOL) 20 MG tablet Take 1 tablet by mouth nightly       No current facility-administered medications for this visit. Social History     Socioeconomic History    Marital status:       Spouse name: None    Number of children: None    Years of education: None    Highest education level: None   Tobacco Use

## 2023-08-31 NOTE — PATIENT INSTRUCTIONS
Your Everyday Guide\" from [employer]. Call 1-488.353.9623 or search [employer] online. You need 7458-3541 mg of calcium and 0556-7795 IU of vitamin D per day. It is possible to meet your calcium requirement with diet alone, but a vitamin D supplement is usually necessary to meet this goal.  When exposed to the sun, use a sunscreen that protects against both UVA and UVB radiation with an SPF of 30 or greater. Reapply every 2 to 3 hours or after sweating, drying off with a towel, or swimming. Always wear a seat belt when traveling in a car. Always wear a helmet when riding a bicycle or motorcycle.

## 2023-09-01 LAB
ALBUMIN SERPL-MCNC: 3.8 G/DL (ref 3.5–5)
ALBUMIN/GLOB SERPL: 1.2 (ref 1.1–2.2)
ALP SERPL-CCNC: 65 U/L (ref 45–117)
ALT SERPL-CCNC: 17 U/L (ref 12–78)
ANION GAP SERPL CALC-SCNC: 4 MMOL/L (ref 5–15)
AST SERPL-CCNC: 17 U/L (ref 15–37)
BASOPHILS # BLD: 0 K/UL (ref 0–0.1)
BASOPHILS NFR BLD: 1 % (ref 0–1)
BILIRUB SERPL-MCNC: 0.4 MG/DL (ref 0.2–1)
BUN SERPL-MCNC: 24 MG/DL (ref 6–20)
BUN/CREAT SERPL: 17 (ref 12–20)
CALCIUM SERPL-MCNC: 9.3 MG/DL (ref 8.5–10.1)
CHLORIDE SERPL-SCNC: 101 MMOL/L (ref 97–108)
CHOLEST SERPL-MCNC: 184 MG/DL
CO2 SERPL-SCNC: 31 MMOL/L (ref 21–32)
CREAT SERPL-MCNC: 1.38 MG/DL (ref 0.55–1.02)
DIFFERENTIAL METHOD BLD: NORMAL
EOSINOPHIL # BLD: 0.2 K/UL (ref 0–0.4)
EOSINOPHIL NFR BLD: 3 % (ref 0–7)
ERYTHROCYTE [DISTWIDTH] IN BLOOD BY AUTOMATED COUNT: 12 % (ref 11.5–14.5)
EST. AVERAGE GLUCOSE BLD GHB EST-MCNC: 126 MG/DL
GLOBULIN SER CALC-MCNC: 3.2 G/DL (ref 2–4)
GLUCOSE SERPL-MCNC: 128 MG/DL (ref 65–100)
HBA1C MFR BLD: 6 % (ref 4–5.6)
HCT VFR BLD AUTO: 44.2 % (ref 35–47)
HDLC SERPL-MCNC: 101 MG/DL
HDLC SERPL: 1.8 (ref 0–5)
HGB BLD-MCNC: 14.4 G/DL (ref 11.5–16)
IMM GRANULOCYTES # BLD AUTO: 0 K/UL (ref 0–0.04)
IMM GRANULOCYTES NFR BLD AUTO: 0 % (ref 0–0.5)
LDLC SERPL CALC-MCNC: 67.8 MG/DL (ref 0–100)
LYMPHOCYTES # BLD: 1.4 K/UL (ref 0.8–3.5)
LYMPHOCYTES NFR BLD: 22 % (ref 12–49)
MCH RBC QN AUTO: 29.4 PG (ref 26–34)
MCHC RBC AUTO-ENTMCNC: 32.6 G/DL (ref 30–36.5)
MCV RBC AUTO: 90.4 FL (ref 80–99)
MONOCYTES # BLD: 0.7 K/UL (ref 0–1)
MONOCYTES NFR BLD: 11 % (ref 5–13)
NEUTS SEG # BLD: 3.9 K/UL (ref 1.8–8)
NEUTS SEG NFR BLD: 63 % (ref 32–75)
NRBC # BLD: 0 K/UL (ref 0–0.01)
NRBC BLD-RTO: 0 PER 100 WBC
PLATELET # BLD AUTO: 175 K/UL (ref 150–400)
PMV BLD AUTO: 10.8 FL (ref 8.9–12.9)
POTASSIUM SERPL-SCNC: 3.1 MMOL/L (ref 3.5–5.1)
PROT SERPL-MCNC: 7 G/DL (ref 6.4–8.2)
RBC # BLD AUTO: 4.89 M/UL (ref 3.8–5.2)
SODIUM SERPL-SCNC: 136 MMOL/L (ref 136–145)
TRIGL SERPL-MCNC: 76 MG/DL
TSH SERPL DL<=0.05 MIU/L-ACNC: 0.7 UIU/ML (ref 0.36–3.74)
VLDLC SERPL CALC-MCNC: 15.2 MG/DL
WBC # BLD AUTO: 6.2 K/UL (ref 3.6–11)

## 2023-09-03 RX ORDER — POTASSIUM CHLORIDE 750 MG/1
10 TABLET, EXTENDED RELEASE ORAL DAILY
Qty: 90 TABLET | Refills: 0 | Status: SHIPPED | OUTPATIENT
Start: 2023-09-03

## 2023-10-02 DIAGNOSIS — I10 ESSENTIAL (PRIMARY) HYPERTENSION: ICD-10-CM

## 2023-10-02 RX ORDER — AMLODIPINE BESYLATE 10 MG/1
10 TABLET ORAL DAILY
Qty: 90 TABLET | Refills: 1 | Status: SHIPPED | OUTPATIENT
Start: 2023-10-02

## 2023-11-14 DIAGNOSIS — E03.9 HYPOTHYROIDISM, UNSPECIFIED: ICD-10-CM

## 2023-11-14 RX ORDER — LEVOTHYROXINE SODIUM 0.07 MG/1
75 TABLET ORAL DAILY
Qty: 90 TABLET | Refills: 0 | Status: SHIPPED | OUTPATIENT
Start: 2023-11-14

## 2023-12-05 ENCOUNTER — TELEPHONE (OUTPATIENT)
Dept: FAMILY MEDICINE CLINIC | Age: 88
End: 2023-12-05

## 2023-12-05 NOTE — TELEPHONE ENCOUNTER
Jpt diagnosed with covid using a home test one week ago.    Daughter has some concerns and would like a phone call to advise.     Best number is .

## 2023-12-05 NOTE — TELEPHONE ENCOUNTER
I have called and spoke to Rayne (Daughter).  She reports that the patient started having symptoms  days ago, tested (+) for Covid then.  They retested her today and she still tested (+).  She is still having a cough and fatigue.  They ryan like to know if there is anything she can take to get over this?

## 2023-12-05 NOTE — TELEPHONE ENCOUNTER
I spoke to daughter  The pt is testing negative now,  She is getting better  Still was fatigued and coughing some    Advised daughter  That pat may test positive for a while  But after 5 d she may go out with a mask for another 5 d  And to let us know if she is getting worse again or not better

## 2024-02-07 DIAGNOSIS — I10 ESSENTIAL (PRIMARY) HYPERTENSION: ICD-10-CM

## 2024-02-07 DIAGNOSIS — E11.9 TYPE 2 DIABETES MELLITUS WITHOUT COMPLICATION, WITHOUT LONG-TERM CURRENT USE OF INSULIN (HCC): ICD-10-CM

## 2024-02-07 DIAGNOSIS — E03.9 HYPOTHYROIDISM, UNSPECIFIED: ICD-10-CM

## 2024-02-08 RX ORDER — METFORMIN HYDROCHLORIDE 500 MG/1
500 TABLET, EXTENDED RELEASE ORAL
Qty: 90 TABLET | Refills: 1 | OUTPATIENT
Start: 2024-02-08

## 2024-02-08 RX ORDER — HYDROCHLOROTHIAZIDE 12.5 MG/1
12.5 TABLET ORAL DAILY
Qty: 90 TABLET | Refills: 1 | OUTPATIENT
Start: 2024-02-08

## 2024-02-08 RX ORDER — AMLODIPINE BESYLATE 10 MG/1
10 TABLET ORAL DAILY
Qty: 90 TABLET | Refills: 1 | OUTPATIENT
Start: 2024-02-08

## 2024-02-08 RX ORDER — LEVOTHYROXINE SODIUM 0.07 MG/1
75 TABLET ORAL DAILY
Qty: 90 TABLET | Refills: 0 | OUTPATIENT
Start: 2024-02-08

## 2024-02-15 ENCOUNTER — OFFICE VISIT (OUTPATIENT)
Dept: FAMILY MEDICINE CLINIC | Age: 89
End: 2024-02-15
Payer: MEDICARE

## 2024-02-15 VITALS
DIASTOLIC BLOOD PRESSURE: 68 MMHG | HEART RATE: 71 BPM | BODY MASS INDEX: 20.7 KG/M2 | SYSTOLIC BLOOD PRESSURE: 123 MMHG | WEIGHT: 98.6 LBS | TEMPERATURE: 98.2 F | RESPIRATION RATE: 16 BRPM | OXYGEN SATURATION: 97 % | HEIGHT: 58 IN

## 2024-02-15 DIAGNOSIS — I10 ESSENTIAL (PRIMARY) HYPERTENSION: Primary | ICD-10-CM

## 2024-02-15 DIAGNOSIS — E87.6 HYPOKALEMIA: ICD-10-CM

## 2024-02-15 DIAGNOSIS — E11.9 TYPE 2 DIABETES MELLITUS WITHOUT COMPLICATION, WITHOUT LONG-TERM CURRENT USE OF INSULIN (HCC): ICD-10-CM

## 2024-02-15 DIAGNOSIS — E03.9 HYPOTHYROIDISM, UNSPECIFIED TYPE: ICD-10-CM

## 2024-02-15 LAB
ANION GAP SERPL CALC-SCNC: 4 MMOL/L (ref 5–15)
BUN SERPL-MCNC: 22 MG/DL (ref 6–20)
BUN/CREAT SERPL: 21 (ref 12–20)
CALCIUM SERPL-MCNC: 9 MG/DL (ref 8.5–10.1)
CHLORIDE SERPL-SCNC: 102 MMOL/L (ref 97–108)
CO2 SERPL-SCNC: 32 MMOL/L (ref 21–32)
CREAT SERPL-MCNC: 1.04 MG/DL (ref 0.55–1.02)
EST. AVERAGE GLUCOSE BLD GHB EST-MCNC: 123 MG/DL
GLUCOSE SERPL-MCNC: 168 MG/DL (ref 65–100)
HBA1C MFR BLD: 5.9 % (ref 4–5.6)
POTASSIUM SERPL-SCNC: 3.3 MMOL/L (ref 3.5–5.1)
SODIUM SERPL-SCNC: 138 MMOL/L (ref 136–145)
TSH SERPL DL<=0.05 MIU/L-ACNC: 0.46 UIU/ML (ref 0.36–3.74)

## 2024-02-15 PROCEDURE — G8420 CALC BMI NORM PARAMETERS: HCPCS | Performed by: FAMILY MEDICINE

## 2024-02-15 PROCEDURE — 1090F PRES/ABSN URINE INCON ASSESS: CPT | Performed by: FAMILY MEDICINE

## 2024-02-15 PROCEDURE — G8484 FLU IMMUNIZE NO ADMIN: HCPCS | Performed by: FAMILY MEDICINE

## 2024-02-15 PROCEDURE — G8428 CUR MEDS NOT DOCUMENT: HCPCS | Performed by: FAMILY MEDICINE

## 2024-02-15 PROCEDURE — 1123F ACP DISCUSS/DSCN MKR DOCD: CPT | Performed by: FAMILY MEDICINE

## 2024-02-15 PROCEDURE — 99214 OFFICE O/P EST MOD 30 MIN: CPT | Performed by: FAMILY MEDICINE

## 2024-02-15 PROCEDURE — 1036F TOBACCO NON-USER: CPT | Performed by: FAMILY MEDICINE

## 2024-02-15 PROCEDURE — 36415 COLL VENOUS BLD VENIPUNCTURE: CPT | Performed by: FAMILY MEDICINE

## 2024-02-15 RX ORDER — LEVOTHYROXINE SODIUM 0.07 MG/1
TABLET ORAL
Qty: 100 TABLET | Refills: 0 | Status: SHIPPED | OUTPATIENT
Start: 2024-02-15

## 2024-02-15 RX ORDER — METFORMIN HYDROCHLORIDE 500 MG/1
500 TABLET, EXTENDED RELEASE ORAL
Qty: 90 TABLET | Refills: 1 | Status: SHIPPED | OUTPATIENT
Start: 2024-02-15

## 2024-02-15 RX ORDER — LEVOTHYROXINE SODIUM 0.07 MG/1
75 TABLET ORAL DAILY
Qty: 90 TABLET | Refills: 0 | Status: SHIPPED | OUTPATIENT
Start: 2024-02-15 | End: 2024-02-15 | Stop reason: CLARIF

## 2024-02-15 RX ORDER — AMLODIPINE BESYLATE 10 MG/1
10 TABLET ORAL DAILY
Qty: 90 TABLET | Refills: 1 | Status: SHIPPED | OUTPATIENT
Start: 2024-02-15

## 2024-02-15 RX ORDER — POTASSIUM CHLORIDE 750 MG/1
10 TABLET, EXTENDED RELEASE ORAL DAILY
Qty: 90 TABLET | Refills: 1 | Status: SHIPPED | OUTPATIENT
Start: 2024-02-15 | End: 2024-02-17 | Stop reason: SDUPTHER

## 2024-02-15 RX ORDER — HYDROCHLOROTHIAZIDE 12.5 MG/1
12.5 TABLET ORAL DAILY
Qty: 90 TABLET | Refills: 1 | Status: SHIPPED | OUTPATIENT
Start: 2024-02-15

## 2024-02-15 NOTE — PROGRESS NOTES
\"Have you been to the ER, urgent care clinic since your last visit?  Hospitalized since your last visit?\"    NO    “Have you seen or consulted any other health care providers outside of [facility] since your last visit?”    Yes she went to podiatrist

## 2024-02-15 NOTE — PROGRESS NOTES
Suzanne Mcwilliams is a 89 y.o. female who presents to the office today with the following:  Chief Complaint   Patient presents with    Medication Refill     And follow up       Medication Refill  Pertinent negatives include no chest pain, congestion or coughing.     Hypothyroidism  Has been out of thyroid meds for a week  Taking one every day except on Sundays one and a half tablet    DM  Last HbA1C 6.0  On Metformin one a day  No BS checks     HTN  And Hypokalemia    Hyperlipidemia  Last Chol good    Review of Systems   Constitutional:  Negative for unexpected weight change.   HENT:  Negative for congestion.    Respiratory:  Negative for cough.    Cardiovascular:  Negative for chest pain.   All other systems reviewed and are negative.      See HPI.    Past Medical History:   Diagnosis Date    Diabetes (HCC)     Glaucoma     Hypercholesterolemia     Hypertension     Incontinence of urine in female     Thyroid disease        Past Surgical History:   Procedure Laterality Date    CATARACT REMOVAL Bilateral 2018       No Known Allergies    Current Outpatient Medications   Medication Sig Dispense Refill    amLODIPine (NORVASC) 10 MG tablet Take 1 tablet by mouth daily 90 tablet 1    hydroCHLOROthiazide 12.5 MG tablet Take 1 tablet by mouth daily 90 tablet 1    levothyroxine (SYNTHROID) 75 MCG tablet Take one every day except one and a half on Sundays 100 tablet 0    metFORMIN (GLUCOPHAGE-XR) 500 MG extended release tablet Take 1 tablet by mouth daily (with breakfast) 90 tablet 1    potassium chloride (KLOR-CON M) 10 MEQ extended release tablet Take 1 tablet by mouth daily 90 tablet 1    pravastatin (PRAVACHOL) 20 MG tablet TAKE 1 TABLET BY MOUTH NIGHTLY 90 tablet 2    calcium carb-cholecalciferol 600-10 MG-MCG TABS per tab Take 1 tablet by mouth daily      latanoprost (XALATAN) 0.005 % ophthalmic solution APPLY ONE DROP TO BOTH EYES ONCE A DAY.       No current facility-administered medications for this visit.       Social

## 2024-02-17 RX ORDER — POTASSIUM CHLORIDE 750 MG/1
20 TABLET, EXTENDED RELEASE ORAL DAILY
Qty: 180 TABLET | Refills: 0 | Status: SHIPPED | OUTPATIENT
Start: 2024-02-17

## 2024-04-22 ENCOUNTER — TELEPHONE (OUTPATIENT)
Dept: FAMILY MEDICINE CLINIC | Age: 89
End: 2024-04-22

## 2024-04-22 NOTE — TELEPHONE ENCOUNTER
----- Message from Jacqueline Nuno sent at 4/22/2024 11:45 AM EDT -----   services need to be requested for:   Suzanne Mcwilliams  4/29/2024 at 11:20 AM   Needed: English Meagan Haddad

## 2024-04-29 ENCOUNTER — OFFICE VISIT (OUTPATIENT)
Dept: FAMILY MEDICINE CLINIC | Age: 89
End: 2024-04-29
Payer: MEDICARE

## 2024-04-29 VITALS
BODY MASS INDEX: 21.37 KG/M2 | HEIGHT: 56 IN | SYSTOLIC BLOOD PRESSURE: 111 MMHG | DIASTOLIC BLOOD PRESSURE: 65 MMHG | HEART RATE: 73 BPM | TEMPERATURE: 97.1 F | RESPIRATION RATE: 12 BRPM | WEIGHT: 95 LBS | OXYGEN SATURATION: 97 %

## 2024-04-29 DIAGNOSIS — E87.6 HYPOKALEMIA: Primary | ICD-10-CM

## 2024-04-29 PROCEDURE — G8427 DOCREV CUR MEDS BY ELIG CLIN: HCPCS | Performed by: FAMILY MEDICINE

## 2024-04-29 PROCEDURE — 99213 OFFICE O/P EST LOW 20 MIN: CPT | Performed by: FAMILY MEDICINE

## 2024-04-29 PROCEDURE — 1036F TOBACCO NON-USER: CPT | Performed by: FAMILY MEDICINE

## 2024-04-29 PROCEDURE — 1090F PRES/ABSN URINE INCON ASSESS: CPT | Performed by: FAMILY MEDICINE

## 2024-04-29 PROCEDURE — 1123F ACP DISCUSS/DSCN MKR DOCD: CPT | Performed by: FAMILY MEDICINE

## 2024-04-29 PROCEDURE — G8420 CALC BMI NORM PARAMETERS: HCPCS | Performed by: FAMILY MEDICINE

## 2024-04-29 SDOH — ECONOMIC STABILITY: FOOD INSECURITY: WITHIN THE PAST 12 MONTHS, THE FOOD YOU BOUGHT JUST DIDN'T LAST AND YOU DIDN'T HAVE MONEY TO GET MORE.: NEVER TRUE

## 2024-04-29 SDOH — ECONOMIC STABILITY: HOUSING INSECURITY
IN THE LAST 12 MONTHS, WAS THERE A TIME WHEN YOU DID NOT HAVE A STEADY PLACE TO SLEEP OR SLEPT IN A SHELTER (INCLUDING NOW)?: NO

## 2024-04-29 SDOH — ECONOMIC STABILITY: INCOME INSECURITY: HOW HARD IS IT FOR YOU TO PAY FOR THE VERY BASICS LIKE FOOD, HOUSING, MEDICAL CARE, AND HEATING?: NOT HARD AT ALL

## 2024-04-29 SDOH — ECONOMIC STABILITY: FOOD INSECURITY: WITHIN THE PAST 12 MONTHS, YOU WORRIED THAT YOUR FOOD WOULD RUN OUT BEFORE YOU GOT MONEY TO BUY MORE.: NEVER TRUE

## 2024-04-29 ASSESSMENT — PATIENT HEALTH QUESTIONNAIRE - PHQ9
2. FEELING DOWN, DEPRESSED OR HOPELESS: NOT AT ALL
SUM OF ALL RESPONSES TO PHQ9 QUESTIONS 1 & 2: 0
SUM OF ALL RESPONSES TO PHQ QUESTIONS 1-9: 0
1. LITTLE INTEREST OR PLEASURE IN DOING THINGS: NOT AT ALL
SUM OF ALL RESPONSES TO PHQ QUESTIONS 1-9: 0

## 2024-04-29 NOTE — PROGRESS NOTES
use: Never     Social Determinants of Health     Financial Resource Strain: Low Risk  (4/29/2024)    Overall Financial Resource Strain (CARDIA)     Difficulty of Paying Living Expenses: Not hard at all   Food Insecurity: No Food Insecurity (4/29/2024)    Hunger Vital Sign     Worried About Running Out of Food in the Last Year: Never true     Ran Out of Food in the Last Year: Never true   Transportation Needs: Unknown (4/29/2024)    PRAPARE - Transportation     Lack of Transportation (Non-Medical): No   Physical Activity: Insufficiently Active (8/31/2023)    Exercise Vital Sign     Days of Exercise per Week: 2 days     Minutes of Exercise per Session: 30 min   Housing Stability: Unknown (4/29/2024)    Housing Stability Vital Sign     Unstable Housing in the Last Year: No       No family history on file.      Physical Exam:  /65 (Site: Right Upper Arm, Position: Sitting, Cuff Size: Medium Adult)   Pulse 73   Temp 97.1 °F (36.2 °C)   Resp 12   Ht 1.422 m (4' 8\")   Wt 43.1 kg (95 lb)   SpO2 97%   BMI 21.30 kg/m²   Physical Exam  Vitals and nursing note reviewed.   Constitutional:       Appearance: She is normal weight.   HENT:      Head: Normocephalic and atraumatic.   Eyes:      Extraocular Movements: Extraocular movements intact.      Conjunctiva/sclera: Conjunctivae normal.   Cardiovascular:      Rate and Rhythm: Normal rate and regular rhythm.      Heart sounds: Normal heart sounds.   Pulmonary:      Effort: Pulmonary effort is normal.      Breath sounds: Normal breath sounds.   Musculoskeletal:      Right lower leg: No edema.      Left lower leg: No edema.   Neurological:      Mental Status: She is alert and oriented to person, place, and time.   Psychiatric:         Mood and Affect: Mood normal.         Behavior: Behavior normal.          Assessment/Plan:   Diagnosis Orders   1. Hypokalemia  Potassium    Potassium      Cont current meds   Increase exercises  Recheck in 3 mo    No follow-up provider

## 2024-04-30 LAB — POTASSIUM SERPL-SCNC: 4.6 MMOL/L (ref 3.5–5.1)

## 2024-05-01 DIAGNOSIS — E03.9 HYPOTHYROIDISM, UNSPECIFIED TYPE: ICD-10-CM

## 2024-05-01 RX ORDER — LEVOTHYROXINE SODIUM 0.07 MG/1
TABLET ORAL
Qty: 90 TABLET | Refills: 1 | Status: SHIPPED | OUTPATIENT
Start: 2024-05-01

## 2024-05-01 NOTE — TELEPHONE ENCOUNTER
Requested Prescriptions     Pending Prescriptions Disp Refills    levothyroxine (SYNTHROID) 75 MCG tablet [Pharmacy Med Name: LEVOTHYROXINE 75 MCG TABLET] 90 tablet 0     Sig: TAKE 1 TABLET BY MOUTH EVERY DAY     Last seen:  4/29/24

## 2024-05-03 DIAGNOSIS — E78.5 HYPERLIPIDEMIA, UNSPECIFIED: ICD-10-CM

## 2024-05-03 RX ORDER — PRAVASTATIN SODIUM 20 MG
20 TABLET ORAL NIGHTLY
Qty: 90 TABLET | Refills: 0 | Status: SHIPPED | OUTPATIENT
Start: 2024-05-03

## 2024-05-03 NOTE — TELEPHONE ENCOUNTER
Requested Prescriptions     Pending Prescriptions Disp Refills    pravastatin (PRAVACHOL) 20 MG tablet [Pharmacy Med Name: PRAVASTATIN SODIUM 20 MG TAB] 90 tablet 2     Sig: TAKE 1 TABLET BY MOUTH EVERY DAY AT NIGHT     Last seen:  4/29/24

## 2024-07-29 ENCOUNTER — OFFICE VISIT (OUTPATIENT)
Dept: FAMILY MEDICINE CLINIC | Age: 89
End: 2024-07-29
Payer: MEDICARE

## 2024-07-29 VITALS
TEMPERATURE: 97.5 F | DIASTOLIC BLOOD PRESSURE: 72 MMHG | SYSTOLIC BLOOD PRESSURE: 125 MMHG | HEIGHT: 58 IN | OXYGEN SATURATION: 97 % | WEIGHT: 95 LBS | HEART RATE: 68 BPM | BODY MASS INDEX: 19.94 KG/M2 | RESPIRATION RATE: 12 BRPM

## 2024-07-29 DIAGNOSIS — I10 ESSENTIAL (PRIMARY) HYPERTENSION: ICD-10-CM

## 2024-07-29 DIAGNOSIS — E87.6 HYPOKALEMIA: ICD-10-CM

## 2024-07-29 DIAGNOSIS — E03.9 HYPOTHYROIDISM, UNSPECIFIED TYPE: ICD-10-CM

## 2024-07-29 DIAGNOSIS — E78.5 HYPERLIPIDEMIA, UNSPECIFIED HYPERLIPIDEMIA TYPE: Primary | ICD-10-CM

## 2024-07-29 DIAGNOSIS — E11.9 TYPE 2 DIABETES MELLITUS WITHOUT COMPLICATION, WITHOUT LONG-TERM CURRENT USE OF INSULIN (HCC): ICD-10-CM

## 2024-07-29 DIAGNOSIS — E78.49 OTHER HYPERLIPIDEMIA: ICD-10-CM

## 2024-07-29 PROCEDURE — 1036F TOBACCO NON-USER: CPT | Performed by: FAMILY MEDICINE

## 2024-07-29 PROCEDURE — G8427 DOCREV CUR MEDS BY ELIG CLIN: HCPCS | Performed by: FAMILY MEDICINE

## 2024-07-29 PROCEDURE — 99214 OFFICE O/P EST MOD 30 MIN: CPT | Performed by: FAMILY MEDICINE

## 2024-07-29 PROCEDURE — 36415 COLL VENOUS BLD VENIPUNCTURE: CPT | Performed by: FAMILY MEDICINE

## 2024-07-29 PROCEDURE — 3044F HG A1C LEVEL LT 7.0%: CPT | Performed by: FAMILY MEDICINE

## 2024-07-29 PROCEDURE — G8420 CALC BMI NORM PARAMETERS: HCPCS | Performed by: FAMILY MEDICINE

## 2024-07-29 PROCEDURE — 1090F PRES/ABSN URINE INCON ASSESS: CPT | Performed by: FAMILY MEDICINE

## 2024-07-29 PROCEDURE — 1123F ACP DISCUSS/DSCN MKR DOCD: CPT | Performed by: FAMILY MEDICINE

## 2024-07-29 RX ORDER — POTASSIUM CHLORIDE 750 MG/1
20 TABLET, EXTENDED RELEASE ORAL DAILY
Qty: 180 TABLET | Refills: 1 | Status: SHIPPED | OUTPATIENT
Start: 2024-07-29

## 2024-07-29 RX ORDER — PRAVASTATIN SODIUM 20 MG
20 TABLET ORAL NIGHTLY
Qty: 90 TABLET | Refills: 1 | Status: SHIPPED | OUTPATIENT
Start: 2024-07-29

## 2024-07-29 RX ORDER — AMLODIPINE BESYLATE 10 MG/1
10 TABLET ORAL DAILY
Qty: 90 TABLET | Refills: 1 | Status: SHIPPED | OUTPATIENT
Start: 2024-07-29

## 2024-07-29 RX ORDER — HYDROCHLOROTHIAZIDE 12.5 MG/1
12.5 TABLET ORAL DAILY
Qty: 90 TABLET | Refills: 1 | Status: SHIPPED | OUTPATIENT
Start: 2024-07-29

## 2024-07-29 ASSESSMENT — PATIENT HEALTH QUESTIONNAIRE - PHQ9
SUM OF ALL RESPONSES TO PHQ QUESTIONS 1-9: 0
SUM OF ALL RESPONSES TO PHQ9 QUESTIONS 1 & 2: 0
1. LITTLE INTEREST OR PLEASURE IN DOING THINGS: NOT AT ALL
2. FEELING DOWN, DEPRESSED OR HOPELESS: NOT AT ALL
SUM OF ALL RESPONSES TO PHQ QUESTIONS 1-9: 0

## 2024-07-29 ASSESSMENT — ENCOUNTER SYMPTOMS
ABDOMINAL PAIN: 0
COUGH: 0

## 2024-07-29 NOTE — PROGRESS NOTES
\"Have you been to the ER, urgent care clinic since your last visit?  Hospitalized since your last visit?\"    NO    “Have you seen or consulted any other health care providers outside of [facility] since your last visit?”    NO            Click Here for Release of Records Request

## 2024-07-29 NOTE — PROGRESS NOTES
Suzanne Mcwilliams is a 89 y.o. female who presents to the office today with the following:  Chief Complaint   Patient presents with    Diabetes     Follow up       Diabetes      DM  Last HbA1C 5.9  On Metformin one a day    Hypokalemia last BW  Has not had the Potassium for 1 mo  No cramps    HTN  On HCTZ 12.5 mg  And on Amlodipine    Hyperlipidemia  Not fasting today    Review of Systems   Constitutional:  Negative for unexpected weight change.   HENT:  Negative for congestion.    Respiratory:  Negative for cough.    Gastrointestinal:  Negative for abdominal pain.       See HPI.    Past Medical History:   Diagnosis Date    Diabetes (HCC)     Glaucoma     Hypercholesterolemia     Hypertension     Incontinence of urine in female     Thyroid disease        Past Surgical History:   Procedure Laterality Date    CATARACT REMOVAL Bilateral 2018       No Known Allergies    Current Outpatient Medications   Medication Sig Dispense Refill    potassium chloride (KLOR-CON M) 10 MEQ extended release tablet Take 2 tablets by mouth daily 180 tablet 1    pravastatin (PRAVACHOL) 20 MG tablet Take 1 tablet by mouth nightly 90 tablet 1    hydroCHLOROthiazide 12.5 MG tablet Take 1 tablet by mouth daily 90 tablet 1    amLODIPine (NORVASC) 10 MG tablet Take 1 tablet by mouth daily 90 tablet 1    levothyroxine (SYNTHROID) 75 MCG tablet TAKE 1 TABLET BY MOUTH EVERY DAY 90 tablet 1    calcium carb-cholecalciferol 600-10 MG-MCG TABS per tab Take 1 tablet by mouth daily      latanoprost (XALATAN) 0.005 % ophthalmic solution APPLY ONE DROP TO BOTH EYES ONCE A DAY.       No current facility-administered medications for this visit.       Social History     Socioeconomic History    Marital status:    Tobacco Use    Smoking status: Never    Smokeless tobacco: Never   Substance and Sexual Activity    Alcohol use: Never    Drug use: Never     Social Determinants of Health     Financial Resource Strain: Low Risk  (4/29/2024)    Overall Financial

## 2024-07-29 NOTE — PROGRESS NOTES
Successful venipuncture in patient's rac X 1 sticks.  The patient tolerated this procedure w/o c/o pain or discomfort.

## 2024-07-30 LAB
ALBUMIN SERPL-MCNC: 3.6 G/DL (ref 3.5–5)
ALBUMIN/GLOB SERPL: 1 (ref 1.1–2.2)
ALP SERPL-CCNC: 85 U/L (ref 45–117)
ALT SERPL-CCNC: 14 U/L (ref 12–78)
ANION GAP SERPL CALC-SCNC: 4 MMOL/L (ref 5–15)
AST SERPL-CCNC: 16 U/L (ref 15–37)
BASOPHILS # BLD: 0 K/UL (ref 0–0.1)
BASOPHILS NFR BLD: 0 % (ref 0–1)
BILIRUB SERPL-MCNC: 0.3 MG/DL (ref 0.2–1)
BUN SERPL-MCNC: 22 MG/DL (ref 6–20)
BUN/CREAT SERPL: 27 (ref 12–20)
CALCIUM SERPL-MCNC: 9 MG/DL (ref 8.5–10.1)
CHLORIDE SERPL-SCNC: 102 MMOL/L (ref 97–108)
CHOLEST SERPL-MCNC: 193 MG/DL
CO2 SERPL-SCNC: 33 MMOL/L (ref 21–32)
CREAT SERPL-MCNC: 0.83 MG/DL (ref 0.55–1.02)
DIFFERENTIAL METHOD BLD: NORMAL
EOSINOPHIL # BLD: 0.1 K/UL (ref 0–0.4)
EOSINOPHIL NFR BLD: 3 % (ref 0–7)
ERYTHROCYTE [DISTWIDTH] IN BLOOD BY AUTOMATED COUNT: 11.9 % (ref 11.5–14.5)
EST. AVERAGE GLUCOSE BLD GHB EST-MCNC: 123 MG/DL
GLOBULIN SER CALC-MCNC: 3.5 G/DL (ref 2–4)
GLUCOSE SERPL-MCNC: 98 MG/DL (ref 65–100)
HBA1C MFR BLD: 5.9 % (ref 4–5.6)
HCT VFR BLD AUTO: 42.5 % (ref 35–47)
HDLC SERPL-MCNC: 107 MG/DL
HDLC SERPL: 1.8 (ref 0–5)
HGB BLD-MCNC: 14.2 G/DL (ref 11.5–16)
IMM GRANULOCYTES # BLD AUTO: 0 K/UL (ref 0–0.04)
IMM GRANULOCYTES NFR BLD AUTO: 0 % (ref 0–0.5)
LDLC SERPL CALC-MCNC: 69.4 MG/DL (ref 0–100)
LYMPHOCYTES # BLD: 1.3 K/UL (ref 0.8–3.5)
LYMPHOCYTES NFR BLD: 23 % (ref 12–49)
MCH RBC QN AUTO: 29.4 PG (ref 26–34)
MCHC RBC AUTO-ENTMCNC: 33.4 G/DL (ref 30–36.5)
MCV RBC AUTO: 88 FL (ref 80–99)
MONOCYTES # BLD: 0.7 K/UL (ref 0–1)
MONOCYTES NFR BLD: 12 % (ref 5–13)
NEUTS SEG # BLD: 3.5 K/UL (ref 1.8–8)
NEUTS SEG NFR BLD: 62 % (ref 32–75)
NRBC # BLD: 0 K/UL (ref 0–0.01)
NRBC BLD-RTO: 0 PER 100 WBC
PLATELET # BLD AUTO: 218 K/UL (ref 150–400)
PMV BLD AUTO: 10.4 FL (ref 8.9–12.9)
POTASSIUM SERPL-SCNC: 3.6 MMOL/L (ref 3.5–5.1)
PROT SERPL-MCNC: 7.1 G/DL (ref 6.4–8.2)
RBC # BLD AUTO: 4.83 M/UL (ref 3.8–5.2)
SODIUM SERPL-SCNC: 139 MMOL/L (ref 136–145)
TRIGL SERPL-MCNC: 83 MG/DL
TSH SERPL DL<=0.05 MIU/L-ACNC: 0.03 UIU/ML (ref 0.36–3.74)
VLDLC SERPL CALC-MCNC: 16.6 MG/DL
WBC # BLD AUTO: 5.6 K/UL (ref 3.6–11)

## 2024-09-20 DIAGNOSIS — E11.9 TYPE 2 DIABETES MELLITUS WITHOUT COMPLICATION, WITHOUT LONG-TERM CURRENT USE OF INSULIN (HCC): ICD-10-CM

## 2024-09-20 RX ORDER — METFORMIN HCL 500 MG
500 TABLET, EXTENDED RELEASE 24 HR ORAL
Qty: 90 TABLET | Refills: 1 | OUTPATIENT
Start: 2024-09-20

## 2024-10-01 ENCOUNTER — OFFICE VISIT (OUTPATIENT)
Dept: FAMILY MEDICINE CLINIC | Age: 89
End: 2024-10-01
Payer: MEDICARE

## 2024-10-01 VITALS
RESPIRATION RATE: 12 BRPM | BODY MASS INDEX: 21.59 KG/M2 | HEART RATE: 75 BPM | TEMPERATURE: 97.3 F | DIASTOLIC BLOOD PRESSURE: 66 MMHG | HEIGHT: 56 IN | SYSTOLIC BLOOD PRESSURE: 114 MMHG | OXYGEN SATURATION: 95 % | WEIGHT: 96 LBS

## 2024-10-01 DIAGNOSIS — E78.49 OTHER HYPERLIPIDEMIA: ICD-10-CM

## 2024-10-01 DIAGNOSIS — M79.605 PAIN IN BOTH LOWER EXTREMITIES: ICD-10-CM

## 2024-10-01 DIAGNOSIS — Z00.00 MEDICARE ANNUAL WELLNESS VISIT, SUBSEQUENT: ICD-10-CM

## 2024-10-01 DIAGNOSIS — E03.9 HYPOTHYROIDISM, UNSPECIFIED TYPE: ICD-10-CM

## 2024-10-01 DIAGNOSIS — R05.1 ACUTE COUGH: Primary | ICD-10-CM

## 2024-10-01 DIAGNOSIS — M79.604 PAIN IN BOTH LOWER EXTREMITIES: ICD-10-CM

## 2024-10-01 PROCEDURE — 1123F ACP DISCUSS/DSCN MKR DOCD: CPT | Performed by: FAMILY MEDICINE

## 2024-10-01 PROCEDURE — 99214 OFFICE O/P EST MOD 30 MIN: CPT | Performed by: FAMILY MEDICINE

## 2024-10-01 PROCEDURE — G0439 PPPS, SUBSEQ VISIT: HCPCS | Performed by: FAMILY MEDICINE

## 2024-10-01 PROCEDURE — 36415 COLL VENOUS BLD VENIPUNCTURE: CPT | Performed by: FAMILY MEDICINE

## 2024-10-01 RX ORDER — AMOXICILLIN AND CLAVULANATE POTASSIUM 500; 125 MG/1; MG/1
1 TABLET, FILM COATED ORAL 2 TIMES DAILY
Qty: 14 TABLET | Refills: 0 | Status: SHIPPED | OUTPATIENT
Start: 2024-10-01 | End: 2024-10-08

## 2024-10-01 RX ORDER — PRAVASTATIN SODIUM 20 MG
TABLET ORAL
Qty: 45 TABLET | Refills: 0
Start: 2024-10-01

## 2024-10-01 ASSESSMENT — LIFESTYLE VARIABLES
HOW OFTEN DO YOU HAVE A DRINK CONTAINING ALCOHOL: NEVER
HOW MANY STANDARD DRINKS CONTAINING ALCOHOL DO YOU HAVE ON A TYPICAL DAY: PATIENT DOES NOT DRINK

## 2024-10-01 ASSESSMENT — ENCOUNTER SYMPTOMS
NAUSEA: 0
WHEEZING: 0
VOMITING: 0
COUGH: 1
SHORTNESS OF BREATH: 0
DIARRHEA: 0
SORE THROAT: 0

## 2024-10-01 ASSESSMENT — PATIENT HEALTH QUESTIONNAIRE - PHQ9
SUM OF ALL RESPONSES TO PHQ QUESTIONS 1-9: 0
1. LITTLE INTEREST OR PLEASURE IN DOING THINGS: NOT AT ALL
SUM OF ALL RESPONSES TO PHQ QUESTIONS 1-9: 0
SUM OF ALL RESPONSES TO PHQ9 QUESTIONS 1 & 2: 0
2. FEELING DOWN, DEPRESSED OR HOPELESS: NOT AT ALL

## 2024-10-01 NOTE — PROGRESS NOTES
Suzanne Mcwilliams is a 89 y.o. female who presents to the office today with the following:  Chief Complaint   Patient presents with    Medicare AWV         Cough     Productive, congestion       HPI    HM reviewed    Cough and congestions started 2 w ago  No fever  Productive    Goes to [facility]  Tried Mucinex but not helping enough    Hypothyroid  Last TSH low  On 75 mcg every day except on Sundays half a tablet now    Leg pain, not active  Daughter thinks poss d/t Pravachol  Last Chol good    Review of Systems   HENT:  Negative for congestion and sore throat.    Respiratory:  Positive for cough. Negative for shortness of breath and wheezing.    Cardiovascular:  Negative for chest pain.   Gastrointestinal:  Negative for diarrhea, nausea and vomiting.   Neurological:  Negative for headaches.       See HPI.    Past Medical History:   Diagnosis Date    Diabetes (HCC)     Glaucoma     Hypercholesterolemia     Hypertension     Incontinence of urine in female     Thyroid disease        Past Surgical History:   Procedure Laterality Date    CATARACT REMOVAL Bilateral 2018       No Known Allergies    Current Outpatient Medications   Medication Sig Dispense Refill    amoxicillin-clavulanate (AUGMENTIN) 500-125 MG per tablet Take 1 tablet by mouth in the morning and at bedtime for 7 days 14 tablet 0    pravastatin (PRAVACHOL) 20 MG tablet Every other day 45 tablet 0    potassium chloride (KLOR-CON M) 10 MEQ extended release tablet Take 2 tablets by mouth daily 180 tablet 1    hydroCHLOROthiazide 12.5 MG tablet Take 1 tablet by mouth daily 90 tablet 1    amLODIPine (NORVASC) 10 MG tablet Take 1 tablet by mouth daily 90 tablet 1    levothyroxine (SYNTHROID) 75 MCG tablet TAKE 1 TABLET BY MOUTH EVERY DAY 90 tablet 1    calcium carb-cholecalciferol 600-10 MG-MCG TABS per tab Take 1 tablet by mouth daily      latanoprost (XALATAN) 0.005 % ophthalmic solution APPLY ONE DROP TO BOTH EYES ONCE A DAY.       No current

## 2024-10-01 NOTE — ADDENDUM NOTE
Addended by: KAYLIN BENNETT on: 10/1/2024 02:52 PM     Modules accepted: Orders, Level of Service

## 2024-10-02 LAB — TSH SERPL DL<=0.05 MIU/L-ACNC: 17.7 UIU/ML (ref 0.36–3.74)

## 2024-10-21 DIAGNOSIS — E03.9 HYPOTHYROIDISM, UNSPECIFIED TYPE: ICD-10-CM

## 2024-10-21 RX ORDER — LEVOTHYROXINE SODIUM 75 UG/1
TABLET ORAL
Qty: 100 TABLET | Refills: 0 | Status: SHIPPED | OUTPATIENT
Start: 2024-10-21

## 2024-10-26 DIAGNOSIS — E11.9 TYPE 2 DIABETES MELLITUS WITHOUT COMPLICATION, WITHOUT LONG-TERM CURRENT USE OF INSULIN (HCC): ICD-10-CM

## 2024-10-27 ENCOUNTER — APPOINTMENT (OUTPATIENT)
Facility: HOSPITAL | Age: 89
End: 2024-10-27
Payer: MEDICARE

## 2024-10-27 ENCOUNTER — HOSPITAL ENCOUNTER (EMERGENCY)
Facility: HOSPITAL | Age: 89
Discharge: HOME | End: 2024-10-27
Attending: EMERGENCY MEDICINE
Payer: MEDICARE

## 2024-10-27 VITALS
WEIGHT: 93 LBS | OXYGEN SATURATION: 95 % | TEMPERATURE: 97.9 F | RESPIRATION RATE: 16 BRPM | BODY MASS INDEX: 20.85 KG/M2 | SYSTOLIC BLOOD PRESSURE: 155 MMHG | DIASTOLIC BLOOD PRESSURE: 76 MMHG | HEART RATE: 81 BPM

## 2024-10-27 DIAGNOSIS — S92.355A NONDISPLACED FRACTURE OF FIFTH METATARSAL BONE, LEFT FOOT, INITIAL ENCOUNTER FOR CLOSED FRACTURE: ICD-10-CM

## 2024-10-27 DIAGNOSIS — E87.6 HYPOKALEMIA: ICD-10-CM
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
## 2024-10-27 PROCEDURE — 6370000000 HC RX 637 (ALT 250 FOR IP): Performed by: EMERGENCY MEDICINE

## 2024-10-27 PROCEDURE — 72131 CT LUMBAR SPINE W/O DYE: CPT

## 2024-10-27 PROCEDURE — 73610 X-RAY EXAM OF ANKLE: CPT

## 2024-10-27 PROCEDURE — 99284 EMERGENCY DEPT VISIT MOD MDM: CPT

## 2024-10-27 PROCEDURE — 73630 X-RAY EXAM OF FOOT: CPT

## 2024-10-27 PROCEDURE — 72192 CT PELVIS W/O DYE: CPT

## 2024-10-27 RX ORDER — ACETAMINOPHEN 325 MG/1
650 TABLET ORAL
Status: COMPLETED | OUTPATIENT
Start: 2024-10-27 | End: 2024-10-27

## 2024-10-27 RX ADMIN — ACETAMINOPHEN 650 MG: 325 TABLET ORAL at 09:06

## 2024-10-27 ASSESSMENT — PAIN - FUNCTIONAL ASSESSMENT: PAIN_FUNCTIONAL_ASSESSMENT: NONE - DENIES PAIN

## 2024-10-27 NOTE — DISCHARGE INSTRUCTIONS
Please try to avoid weightbearing on the foot.  Your mom can walk short distances with a walker.  She should try to keep her foot elevated whenever possible.  She can take Tylenol 500 mg 4 times a day for her foot pain.    Dr. Mays is an orthopedic doctor.  Please make an appointment to follow-up with him to discuss when she can stop using the boot and for any further recommendations.

## 2024-10-27 NOTE — ED PROVIDER NOTES
tablet  Commonly known as: NORVASC  Take 1 tablet by mouth daily     calcium carb-cholecalciferol 600-10 MG-MCG Tabs per tab     hydroCHLOROthiazide 12.5 MG tablet  Take 1 tablet by mouth daily     latanoprost 0.005 % ophthalmic solution  Commonly known as: XALATAN     levothyroxine 75 MCG tablet  Commonly known as: SYNTHROID  TAKE 1 TAB BY MOUTH EVERY DAY EXCEPT ONE AND A HALF ON SUNDAYS     potassium chloride 10 MEQ extended release tablet  Commonly known as: KLOR-CON M  Take 2 tablets by mouth daily     pravastatin 20 MG tablet  Commonly known as: PRAVACHOL  Every other day                DISCONTINUED MEDICATIONS:  Discharge Medication List as of 10/27/2024 10:34 AM          I am the Primary Clinician of Record.   Lyndsey Barrera MD (electronically signed)    (Please note that parts of this dictation were completed with voice recognition software. Quite often unanticipated grammatical, syntax, homophones, and other interpretive errors are inadvertently transcribed by the computer software. Please disregards these errors. Please excuse any errors that have escaped final proofreading.)         Lyndsey Barrera MD  10/27/24 [id number]

## 2024-10-28 RX ORDER — METFORMIN HYDROCHLORIDE 500 MG/1
500 TABLET, EXTENDED RELEASE ORAL
Qty: 90 TABLET | Refills: 0 | Status: SHIPPED | OUTPATIENT
Start: 2024-10-28

## 2024-10-28 RX ORDER — POTASSIUM CHLORIDE 750 MG/1
10 TABLET, EXTENDED RELEASE ORAL DAILY
Qty: 90 TABLET | Refills: 0 | Status: SHIPPED | OUTPATIENT
Start: 2024-10-28

## 2024-11-06 ENCOUNTER — TELEPHONE (OUTPATIENT)
Dept: FAMILY MEDICINE CLINIC | Age: 88
End: 2024-11-06

## 2024-11-06 NOTE — TELEPHONE ENCOUNTER
Rayne, daughter, said patient fell and hurt her foot and may need PT but would like to talk to someone first.  Please call her at [phone]

## 2024-11-06 NOTE — TELEPHONE ENCOUNTER
I have called and spoke to Rayne.  She reports that the patent fell on 10/27/24 and take to ED.  Has a broken left foot.  Saw Dr Mays on Monday.  Her right foot is also hurting, patient refusing to get out of bed other then to go to the bathroom or to go eat.  She will not use the walker, having to use a wheelchair.  She is refusing to do anything because of her pain.  She wont even watch her favorite shows.  She is taking Ibuprofen 600 MG every 6 hours and Tylenol  650 MG every 6 hours as well as Icy Hot topical analgesic.  This does not seem to help as she is crying and refusing to get out of bed.    1)  can they get a referral for In-Home P.T. to try to help the patient in being mobile    2)  what else can they do for her pain

## 2024-11-07 ENCOUNTER — TELEPHONE (OUTPATIENT)
Dept: FAMILY MEDICINE CLINIC | Age: 89
End: 2024-11-07

## 2024-11-07 NOTE — TELEPHONE ENCOUNTER
Unable to reach daughter  Left a msg to have right foot checked in ER or Dr Mays or here if pt is having pain in right foot too.  I would not do PT until it is clear she does not have a fracture there  Leave a msg with nurses over the weekend  BIPIN Small

## 2024-11-08 ENCOUNTER — APPOINTMENT (OUTPATIENT)
Facility: HOSPITAL | Age: 89
End: 2024-11-08
Payer: MEDICARE

## 2024-11-08 ENCOUNTER — HOSPITAL ENCOUNTER (EMERGENCY)
Facility: HOSPITAL | Age: 89
Discharge: HOME | End: 2024-11-08
Attending: EMERGENCY MEDICINE
Payer: MEDICARE

## 2024-11-08 VITALS
WEIGHT: 93 LBS | OXYGEN SATURATION: 98 % | DIASTOLIC BLOOD PRESSURE: 61 MMHG | HEART RATE: 78 BPM | RESPIRATION RATE: 18 BRPM | BODY MASS INDEX: 20.85 KG/M2 | SYSTOLIC BLOOD PRESSURE: 137 MMHG | TEMPERATURE: 98.5 F

## 2024-11-08 DIAGNOSIS — M79.651 PAIN OF RIGHT THIGH: Primary | ICD-10-CM

## 2024-11-08 PROCEDURE — 73630 X-RAY EXAM OF FOOT: CPT

## 2024-11-08 PROCEDURE — 73562 X-RAY EXAM OF KNEE 3: CPT

## 2024-11-08 PROCEDURE — 99283 EMERGENCY DEPT VISIT LOW MDM: CPT

## 2024-11-08 RX ORDER — LIDOCAINE 4 G/G
1 PATCH TOPICAL DAILY
Qty: 30 PATCH | Refills: 0 | Status: SHIPPED | OUTPATIENT
Start: 2024-11-08 | End: 2024-12-08

## 2024-11-08 ASSESSMENT — PAIN DESCRIPTION - LOCATION: LOCATION: FOOT

## 2024-11-08 ASSESSMENT — PAIN - FUNCTIONAL ASSESSMENT: PAIN_FUNCTIONAL_ASSESSMENT: 0-10

## 2024-11-08 ASSESSMENT — PAIN SCALES - GENERAL: PAINLEVEL_OUTOF10: 4

## 2024-11-08 ASSESSMENT — PAIN DESCRIPTION - ORIENTATION: ORIENTATION: RIGHT

## 2024-11-08 NOTE — ED TRIAGE NOTES
Pt arrived with c/o right foot pain, was recently seen and treated here after a fall for left foot pain but after d/c got home and c/o right foot pain

## 2024-11-09 NOTE — ED PROVIDER NOTES
[facility] EMERGENCY DEP  EMERGENCY DEPARTMENT ENCOUNTER       Pt Name: Suzanne Mcwilliams  MRN: [id number]  Birthdate 8/25/1932  Date of evaluation: 11/8/2024  Provider: Lyndsey Barrera MD   PCP: Meagan Haddad MD  Note Started: 7:34 PM EST 11/8/24     CHIEF COMPLAINT       Chief Complaint   Patient presents with    Foot Pain        HISTORY OF PRESENT ILLNESS: 1 or more elements      History From: Son and daughter, History limited by: none     Suzanne Mcwilliams is a 89 y.o. female presents to the emergency department complaining of right thigh pain.       Please See MDM for Additional Details of the HPI/PMH  Nursing Notes were all reviewed and agreed with or any disagreements were addressed in the HPI.     REVIEW OF SYSTEMS        Positives and Pertinent negatives as per HPI.    PAST HISTORY     Past Medical History:  Past Medical History:   Diagnosis Date    Diabetes (HCC)     Glaucoma     Hypercholesterolemia     Hypertension     Incontinence of urine in female     Thyroid disease        Past Surgical History:  Past Surgical History:   Procedure Laterality Date    CATARACT REMOVAL Bilateral 2018       Family History:  History reviewed. No pertinent family history.    Social History:  Social History     Tobacco Use    Smoking status: Never    Smokeless tobacco: Never   Substance Use Topics    Alcohol use: Never    Drug use: Never       Allergies:  No Known Allergies    CURRENT MEDICATIONS      Discharge Medication List as of 11/8/2024  6:38 PM        CONTINUE these medications which have NOT CHANGED    Details   metFORMIN (GLUCOPHAGE-XR) 500 MG extended release tablet TAKE 1 TABLET BY MOUTH EVERY DAY WITH BREAKFAST, Disp-90 tablet, R-0Normal      potassium chloride (KLOR-CON M10) 10 MEQ extended release tablet TAKE 1 TABLET BY MOUTH EVERY DAY, Disp-90 tablet, R-0Normal      levothyroxine (SYNTHROID) 75 MCG tablet TAKE 1 TAB BY MOUTH EVERY DAY EXCEPT ONE AND A HALF ON SUNDAYS, Disp-100 tablet, R-0Normal      pravastatin  [facility] [address] [phone]      As needed       DISCHARGE MEDICATIONS:     Medication List        START taking these medications      lidocaine 4 % external patch  Place 1 patch onto the skin daily            ASK your doctor about these medications      amLODIPine 10 MG tablet  Commonly known as: NORVASC  Take 1 tablet by mouth daily     calcium carb-cholecalciferol 600-10 MG-MCG Tabs per tab     hydroCHLOROthiazide 12.5 MG tablet  Take 1 tablet by mouth daily     Klor-Con M10 10 MEQ extended release tablet  Generic drug: potassium chloride  TAKE 1 TABLET BY MOUTH EVERY DAY     latanoprost 0.005 % ophthalmic solution  Commonly known as: XALATAN     levothyroxine 75 MCG tablet  Commonly known as: SYNTHROID  TAKE 1 TAB BY MOUTH EVERY DAY EXCEPT ONE AND A HALF ON SUNDAYS     metFORMIN 500 MG extended release tablet  Commonly known as: GLUCOPHAGE-XR  TAKE 1 TABLET BY MOUTH EVERY DAY WITH BREAKFAST     pravastatin 20 MG tablet  Commonly known as: PRAVACHOL  Every other day               Where to Get Your Medications        These medications were sent to [id number] - [place], [place] - [address] [phone] - F [phone]  [address] [address]      Phone: [phone]   lidocaine 4 % external patch           DISCONTINUED MEDICATIONS:  Discharge Medication List as of 11/8/2024  6:38 PM          I am the Primary Clinician of Record.   Lyndsey Barrera MD (electronically signed)    (Please note that parts of this dictation were completed with voice recognition software. Quite often unanticipated grammatical, syntax, homophones, and other interpretive errors are inadvertently transcribed by the computer software. Please disregards these errors. Please excuse any errors that have escaped final proofreading.)         Lyndsey Barrera MD  11/08/24 1936

## 2024-11-12 NOTE — TELEPHONE ENCOUNTER
I was able to reach her daughter, pt is still not getting up much  Only for short period  Did go to the ER and had Xrays of right side and all ok  Encouraged daughter to have her sit up for a while in lounging chair and legs up  Daughter made appointment with us on Monday  Will try to set up  PT/OT

## 2024-11-18 ENCOUNTER — OFFICE VISIT (OUTPATIENT)
Dept: FAMILY MEDICINE CLINIC | Age: 89
End: 2024-11-18
Payer: MEDICARE

## 2024-11-18 VITALS
OXYGEN SATURATION: 96 % | TEMPERATURE: 97.5 F | WEIGHT: 93 LBS | SYSTOLIC BLOOD PRESSURE: 140 MMHG | HEIGHT: 57 IN | RESPIRATION RATE: 12 BRPM | DIASTOLIC BLOOD PRESSURE: 74 MMHG | BODY MASS INDEX: 20.06 KG/M2 | HEART RATE: 79 BPM

## 2024-11-18 DIAGNOSIS — M79.604 RIGHT LEG PAIN: Primary | ICD-10-CM

## 2024-11-18 DIAGNOSIS — M17.11 PRIMARY OSTEOARTHRITIS OF RIGHT KNEE: ICD-10-CM

## 2024-11-18 PROCEDURE — 1126F AMNT PAIN NOTED NONE PRSNT: CPT | Performed by: FAMILY MEDICINE

## 2024-11-18 PROCEDURE — 1125F AMNT PAIN NOTED PAIN PRSNT: CPT | Performed by: FAMILY MEDICINE

## 2024-11-18 PROCEDURE — 1123F ACP DISCUSS/DSCN MKR DOCD: CPT | Performed by: FAMILY MEDICINE

## 2024-11-18 PROCEDURE — 1159F MED LIST DOCD IN RCRD: CPT | Performed by: FAMILY MEDICINE

## 2024-11-18 PROCEDURE — 99213 OFFICE O/P EST LOW 20 MIN: CPT | Performed by: FAMILY MEDICINE

## 2024-11-18 ASSESSMENT — PATIENT HEALTH QUESTIONNAIRE - PHQ9
1. LITTLE INTEREST OR PLEASURE IN DOING THINGS: NOT AT ALL
2. FEELING DOWN, DEPRESSED OR HOPELESS: NOT AT ALL
SUM OF ALL RESPONSES TO PHQ QUESTIONS 1-9: 0
SUM OF ALL RESPONSES TO PHQ9 QUESTIONS 1 & 2: 0
SUM OF ALL RESPONSES TO PHQ QUESTIONS 1-9: 0

## 2024-11-18 NOTE — PROGRESS NOTES
Suzanne Mcwilliams is a 89 y.o. female who presents to the office today with the following:  Chief Complaint   Patient presents with    Foot Pain     R foot pain, not the L one that was broken       Foot Pain       Fracture in left foot not bothersome now  Has seed Dr Mays and has F/U with him in a few days    But right leg pain now from shin to thigh  Does not walk d/t pain  Except standing with walker for 5 min only in shower  No redness or swelling  Taking Ibuprofen 600 mg and Tylenol 650 mg    Has OA in right knee on Xray  No fracture    Daughter has a difficult time to get pt out of bed  Would like to try PT    Review of Systems    See HPI.    Past Medical History:   Diagnosis Date    Diabetes (HCC)     Glaucoma     Hypercholesterolemia     Hypertension     Incontinence of urine in female     Thyroid disease        Past Surgical History:   Procedure Laterality Date    CATARACT REMOVAL Bilateral 2018       No Known Allergies    Current Outpatient Medications   Medication Sig Dispense Refill    lidocaine 4 % external patch Place 1 patch onto the skin daily 30 patch 0    levothyroxine (SYNTHROID) 75 MCG tablet TAKE 1 TAB BY MOUTH EVERY DAY EXCEPT ONE AND A HALF ON SUNDAYS 100 tablet 0    pravastatin (PRAVACHOL) 20 MG tablet Every other day 45 tablet 0    hydroCHLOROthiazide 12.5 MG tablet Take 1 tablet by mouth daily 90 tablet 1    amLODIPine (NORVASC) 10 MG tablet Take 1 tablet by mouth daily 90 tablet 1    calcium carb-cholecalciferol 600-10 MG-MCG TABS per tab Take 1 tablet by mouth daily      latanoprost (XALATAN) 0.005 % ophthalmic solution APPLY ONE DROP TO BOTH EYES ONCE A DAY.      metFORMIN (GLUCOPHAGE-XR) 500 MG extended release tablet TAKE 1 TABLET BY MOUTH EVERY DAY WITH BREAKFAST (Patient not taking: Reported on 11/18/2024) 90 tablet 0    potassium chloride (KLOR-CON M10) 10 MEQ extended release tablet TAKE 1 TABLET BY MOUTH EVERY DAY (Patient taking differently: Take 1 tablet by mouth 2 times daily) 90

## 2024-11-18 NOTE — PROGRESS NOTES
\"Have you been to the ER, urgent care clinic since your last visit?  Hospitalized since your last visit?\"    YES - When: approximately 4  weeks ago.  Where and Why: [facility], broken foot.    “Have you seen or consulted any other health care providers outside our system since your last visit?”    NO

## 2024-11-25 ENCOUNTER — HOSPITAL ENCOUNTER (OUTPATIENT)
Facility: HOSPITAL | Age: 88
Discharge: HOME | End: 2024-11-28
Payer: MEDICARE

## 2024-11-25 ENCOUNTER — TRANSCRIBE ORDERS (OUTPATIENT)
Facility: HOSPITAL | Age: 88
End: 2024-11-25

## 2024-11-25 DIAGNOSIS — S92.355A CLOSED NONDISPLACED FRACTURE OF FIFTH METATARSAL BONE OF LEFT FOOT, INITIAL ENCOUNTER: ICD-10-CM

## 2024-11-25 DIAGNOSIS — S92.355A CLOSED NONDISPLACED FRACTURE OF FIFTH METATARSAL BONE OF LEFT FOOT, INITIAL ENCOUNTER: Primary | ICD-10-CM

## 2024-11-25 PROCEDURE — 73630 X-RAY EXAM OF FOOT: CPT

## 2024-12-20 ENCOUNTER — HOSPITAL ENCOUNTER (OUTPATIENT)
Facility: HOSPITAL | Age: 88
Setting detail: RECURRING SERIES
Discharge: HOME | End: 2024-12-23
Attending: ORTHOPAEDIC SURGERY
Payer: MEDICARE

## 2024-12-20 DIAGNOSIS — M79.604 RIGHT LEG PAIN: ICD-10-CM

## 2024-12-20 PROCEDURE — 3430000000 HC RX DIAGNOSTIC RADIOPHARMACEUTICAL: Performed by: ORTHOPAEDIC SURGERY

## 2024-12-20 PROCEDURE — A9503 TC99M MEDRONATE: HCPCS | Performed by: ORTHOPAEDIC SURGERY

## 2024-12-20 PROCEDURE — 78300 BONE IMAGING LIMITED AREA: CPT

## 2024-12-20 RX ORDER — TC 99M MEDRONATE 20 MG/10ML
21.7 INJECTION, POWDER, LYOPHILIZED, FOR SOLUTION INTRAVENOUS
Status: COMPLETED | OUTPATIENT
Start: 2024-12-20 | End: 2024-12-20

## 2024-12-20 RX ADMIN — TC 99M MEDRONATE 21.7 MILLICURIE: 20 INJECTION, POWDER, LYOPHILIZED, FOR SOLUTION INTRAVENOUS at 11:05

## 2024-12-23 ENCOUNTER — HOSPITAL ENCOUNTER (OUTPATIENT)
Facility: HOSPITAL | Age: 88
Setting detail: RECURRING SERIES
Discharge: HOME | End: 2024-12-26
Payer: MEDICARE

## 2024-12-23 PROCEDURE — 97161 PT EVAL LOW COMPLEX 20 MIN: CPT

## 2024-12-23 NOTE — THERAPY EVALUATION
[facility]  [address] [place] [address]  Office [phone]  Fax [phone]       PHYSICAL THERAPY - MEDICARE EVALUATION/PLAN OF CARE NOTE (updated 3/23)      Date: 2024          Patient Name:  Suzanne Mcwilliams :  1932   Medical   Diagnosis:  Right leg pain [M79.604]  Primary osteoarthritis of right knee [M17.11] Treatment Diagnosis:  M25.561  RIGHT KNEE PAIN    Referral Source:  Meagan Haddad MD Insurance:  Payor: [facility] MEDICARE / Plan: AN[employer] FULL DUAL ADVANTAGE 2 / Product Type: *No Product type* /             Patient  verified yes     Visit #   Current  / Total 1 16     SUBJECTIVE  Pain Level (0-10 scale): 0-5/10  []constant []intermittent []improving []worsening []no change since onset    Any medication changes, allergies to medications, adverse drug reactions, diagnosis change, or new procedure performed?: [x] No    [] Yes (see summary sheet for update)  Medications: Verified on Patient Summary List    Subjective functional status/changes:     Patient reports R knee pain, she ambulates a RW, lives with daughter, grandson brought patient  Difficulty getting in and out of bed without assistance  Cannot stand long  H/o falls    Onset Date: months  Start of Care: 2024  PLOF: lives with daughter and grandson ambulates with RW,they assist her with bathing, dressing and mobility needs  Comorbidities: history of falls  Pt Goals: to be able to walk  Barriers: []pain []financial []time []transportation []other    OBJECTIVE    Posture:  stands with hips and knees flexed  Gait and Functional Mobility:  ambulates 20' with RW and SBA with decreased foot clearance,  Bed mobility min A supine to sit; sit to stand SBA  Palpation: minimal tenderness R knee jt line  MMT: 4-/5 bilateral LE  Neurological: Reflexes / Sensations: intact LE sensation  Objective/Functional Outcome Measure: 30 second sit to stand 9x      15 min [x]Eval -

## 2025-01-09 ENCOUNTER — OFFICE VISIT (OUTPATIENT)
Dept: FAMILY MEDICINE CLINIC | Age: 89
End: 2025-01-09

## 2025-01-09 VITALS
WEIGHT: 93 LBS | SYSTOLIC BLOOD PRESSURE: 130 MMHG | HEART RATE: 67 BPM | HEIGHT: 57 IN | OXYGEN SATURATION: 98 % | TEMPERATURE: 97.5 F | BODY MASS INDEX: 20.06 KG/M2 | DIASTOLIC BLOOD PRESSURE: 68 MMHG

## 2025-01-09 DIAGNOSIS — E03.9 HYPOTHYROIDISM, UNSPECIFIED TYPE: ICD-10-CM

## 2025-01-09 DIAGNOSIS — Z23 IMMUNIZATION DUE: ICD-10-CM

## 2025-01-09 DIAGNOSIS — E87.6 HYPOKALEMIA: ICD-10-CM

## 2025-01-09 DIAGNOSIS — E78.49 OTHER HYPERLIPIDEMIA: ICD-10-CM

## 2025-01-09 DIAGNOSIS — E11.9 TYPE 2 DIABETES MELLITUS WITHOUT COMPLICATION, WITHOUT LONG-TERM CURRENT USE OF INSULIN (HCC): Primary | ICD-10-CM

## 2025-01-09 DIAGNOSIS — I10 ESSENTIAL (PRIMARY) HYPERTENSION: ICD-10-CM

## 2025-01-09 RX ORDER — HYDROCHLOROTHIAZIDE 12.5 MG/1
12.5 TABLET ORAL DAILY
Qty: 90 TABLET | Refills: 1 | Status: SHIPPED | OUTPATIENT
Start: 2025-01-09

## 2025-01-09 RX ORDER — POTASSIUM CHLORIDE 750 MG/1
10 TABLET, EXTENDED RELEASE ORAL 2 TIMES DAILY
Qty: 180 TABLET | Refills: 1 | Status: SHIPPED | OUTPATIENT
Start: 2025-01-09

## 2025-01-09 RX ORDER — AMLODIPINE BESYLATE 10 MG/1
10 TABLET ORAL DAILY
Qty: 90 TABLET | Refills: 1 | Status: SHIPPED | OUTPATIENT
Start: 2025-01-09

## 2025-01-09 RX ORDER — PRAVASTATIN SODIUM 20 MG
TABLET ORAL
Qty: 45 TABLET | Refills: 1 | Status: SHIPPED | OUTPATIENT
Start: 2025-01-09

## 2025-01-09 SDOH — ECONOMIC STABILITY: FOOD INSECURITY: WITHIN THE PAST 12 MONTHS, THE FOOD YOU BOUGHT JUST DIDN'T LAST AND YOU DIDN'T HAVE MONEY TO GET MORE.: NEVER TRUE

## 2025-01-09 SDOH — ECONOMIC STABILITY: FOOD INSECURITY: WITHIN THE PAST 12 MONTHS, YOU WORRIED THAT YOUR FOOD WOULD RUN OUT BEFORE YOU GOT MONEY TO BUY MORE.: NEVER TRUE

## 2025-01-09 ASSESSMENT — PATIENT HEALTH QUESTIONNAIRE - PHQ9
SUM OF ALL RESPONSES TO PHQ QUESTIONS 1-9: 0
1. LITTLE INTEREST OR PLEASURE IN DOING THINGS: NOT AT ALL
SUM OF ALL RESPONSES TO PHQ QUESTIONS 1-9: 0
2. FEELING DOWN, DEPRESSED OR HOPELESS: NOT AT ALL
SUM OF ALL RESPONSES TO PHQ QUESTIONS 1-9: 0
SUM OF ALL RESPONSES TO PHQ9 QUESTIONS 1 & 2: 0
SUM OF ALL RESPONSES TO PHQ QUESTIONS 1-9: 0

## 2025-01-09 ASSESSMENT — ENCOUNTER SYMPTOMS
CONSTIPATION: 0
DIARRHEA: 0
COUGH: 0

## 2025-01-09 NOTE — PROGRESS NOTES
Suzanne Mcwilliams is a 89 y.o. female who presents to the office today with the following:  Chief Complaint   Patient presents with    Leg Pain     Follow up       Leg Pain       DM  Last HbA1C 5.9  Off Metformin now    HTN  BP is good today    Hyperlipidemia  Last Chol was good    Hypothyroid  Last TSH very high  Taking meds regularly        Review of Systems   Constitutional:  Negative for unexpected weight change.   HENT:  Negative for congestion.    Respiratory:  Negative for cough.    Cardiovascular:  Negative for chest pain.   Gastrointestinal:  Negative for constipation and diarrhea.   Psychiatric/Behavioral:  Negative for dysphoric mood. The patient is not nervous/anxious.        See HPI.    Past Medical History:   Diagnosis Date    Diabetes (HCC)     Glaucoma     Hypercholesterolemia     Hypertension     Incontinence of urine in female     Thyroid disease        Past Surgical History:   Procedure Laterality Date    CATARACT REMOVAL Bilateral 2018       No Known Allergies    Current Outpatient Medications   Medication Sig Dispense Refill    amLODIPine (NORVASC) 10 MG tablet Take 1 tablet by mouth daily 90 tablet 1    hydroCHLOROthiazide 12.5 MG tablet Take 1 tablet by mouth daily 90 tablet 1    potassium chloride (KLOR-CON M10) 10 MEQ extended release tablet Take 1 tablet by mouth 2 times daily 180 tablet 1    pravastatin (PRAVACHOL) 20 MG tablet Every other day 45 tablet 1    levothyroxine (SYNTHROID) 75 MCG tablet TAKE 1 TAB BY MOUTH EVERY DAY EXCEPT ONE AND A HALF ON SUNDAYS 100 tablet 0    calcium carb-cholecalciferol 600-10 MG-MCG TABS per tab Take 1 tablet by mouth daily      latanoprost (XALATAN) 0.005 % ophthalmic solution APPLY ONE DROP TO BOTH EYES ONCE A DAY.       No current facility-administered medications for this visit.       Social History     Socioeconomic History    Marital status:    Tobacco Use    Smoking status: Never    Smokeless tobacco: Never   Substance and Sexual Activity

## 2025-01-09 NOTE — PROGRESS NOTES
Successful venipuncture in patient's right ac X 1 sticks.  The patient tolerated this procedure w/o c/o pain or discomfort.    Patient was administered vaccine Flu in left deltoid via IM.  Patient tolerated well.  Medication information reviewed with patient, patient states understanding. Patient to resume routine medications at home.  Patient given copy of AVS and VIIS with medication information and instructions for home. VIIS reviewed with patient and patient states understanding.

## 2025-01-10 DIAGNOSIS — E03.9 HYPOTHYROIDISM, UNSPECIFIED TYPE: ICD-10-CM

## 2025-01-10 LAB
BUN SERPL-MCNC: 19 MG/DL (ref 10–36)
BUN/CREAT SERPL: 27 (ref 12–28)
CALCIUM SERPL-MCNC: 8.9 MG/DL (ref 8.7–10.3)
CHLORIDE SERPL-SCNC: 103 MMOL/L (ref 96–106)
CO2 SERPL-SCNC: 25 MMOL/L (ref 20–29)
CREAT SERPL-MCNC: 0.71 MG/DL (ref 0.57–1)
EGFRCR SERPLBLD CKD-EPI 2021: 80 ML/MIN/1.73
GLUCOSE SERPL-MCNC: 95 MG/DL (ref 70–99)
HBA1C MFR BLD: 6.3 % (ref 4.8–5.6)
POTASSIUM SERPL-SCNC: 3.7 MMOL/L (ref 3.5–5.2)
SODIUM SERPL-SCNC: 143 MMOL/L (ref 134–144)
TSH SERPL DL<=0.005 MIU/L-ACNC: 1.81 UIU/ML (ref 0.45–4.5)

## 2025-01-10 RX ORDER — LEVOTHYROXINE SODIUM 75 UG/1
TABLET ORAL
Qty: 100 TABLET | Refills: 1 | Status: SHIPPED | OUTPATIENT
Start: 2025-01-10

## 2025-01-22 ENCOUNTER — HOSPITAL ENCOUNTER (OUTPATIENT)
Facility: HOSPITAL | Age: 89
Setting detail: RECURRING SERIES
End: 2025-01-22
Payer: MEDICARE

## 2025-01-22 NOTE — PROGRESS NOTES
[facility]  [address] [place] [address]  Office [phone]  Fax [phone]   PHYSICAL THERAPY PROGRESS NOTE  Patient Name:  Suzanne Mcwilliams :  1932   Treatment/Medical Diagnosis: Right leg pain [M79.604]  Primary osteoarthritis of right knee [M17.11]   Referral Source:  Meagan Haddad MD     Date of Initial Visit:  2024 Attended Visits:  1 Missed Visits:       SUMMARY OF TREATMENT/ASSESSMENT:  Patient being seen for bed mobility training, gait and transfer training, and strengthening to help decrease pain.    CURRENT STATUS  Patient seen for the evaluation and unable to return today for follow up due to inclement weather.  Patient scheduled to return next week.      Short Term Goals: To be accomplished in 8 treatments   Patient will be independent in a HEP.  Patient will perform transfers independently.  Patient will perform bed mobility independently.  Patient will ambulate 100' with supervised independence with the RW.  Long Term Goals: To be accomplished in 16 treatments   Patient will ambulate 120' with the RW independently in the clinic.  Patient and family will report patient is independent with her transfers, gait, and bed mobility at home.  RECOMMENDATIONS  Continue skilled PT 1-2 times/week up to 16 visits to increase mobility and decrease pain.        Chasity Berman, PT       2025       8:57 AM    If you have any questions/comments please contact us directly at [phone].   Thank you for allowing us to assist in the care of your patient.

## 2025-01-28 ENCOUNTER — HOSPITAL ENCOUNTER (OUTPATIENT)
Facility: HOSPITAL | Age: 89
Setting detail: RECURRING SERIES
Discharge: HOME | End: 2025-01-31
Payer: MEDICARE

## 2025-01-28 PROCEDURE — 97110 THERAPEUTIC EXERCISES: CPT

## 2025-01-28 PROCEDURE — 97112 NEUROMUSCULAR REEDUCATION: CPT

## 2025-01-28 NOTE — PROGRESS NOTES
PHYSICAL THERAPY - MEDICARE DAILY TREATMENT NOTE (updated 3/23)      Date: 2025          Patient Name:  Suzanne Mcwilliams :  1932   Medical   Diagnosis:  Right leg pain [M79.604]  Primary osteoarthritis of right knee [M17.11] Treatment Diagnosis:  M25.561  RIGHT KNEE PAIN    Referral Source:  Meagan Haddad MD Insurance:   Payor: [facility] MEDICARE / Plan: [facility] FULL DUAL ADVANTAGE 2 / Product Type: *No Product type* /                     Patient  verified yes     Visit #   Current  / Total 2 16   Time   In / Out 1400 1445   Total Treatment Time 45   Total Timed Codes 45   1:1 Treatment Time 45      [facility] Totals Reminder:  bill using total billable   min of TIMED therapeutic procedures and modalities.   8-22 min = 1 unit; 23-37 min = 2 units; 38-52 min = 3 units; 53-67 min = 4 units; 68-82 min = 5 units        .episode  SUBJECTIVE    Pain Level (0-10 scale): 0/10    Any medication changes, allergies to medications, adverse drug reactions, diagnosis change, or new procedure performed?: [x] No    [] Yes (see summary sheet for update)  Medications: Verified on Patient Summary List    Subjective functional status/changes:     \" I don't hurt\"    OBJECTIVE      Therapeutic Procedures:  Tx Min Billable or 1:1 Min (if diff from Tx Min) Procedure, Rationale, Specifics   30 30 22548 Therapeutic Exercise (timed):  increase ROM, strength, coordination, balance, and proprioception to improve patient's ability to progress to PLOF and address remaining functional goals. (see flow sheet as applicable)     Details if applicable:      Seated Marching with 2# ankle weights x15 BLE  Seated LAQ with 2# ankle weights x 15 BLE  Seated HT raises x 30 reps  Seated hip add with blue ball x 30 reps     15 15 84033 Neuromuscular Re-Education (timed):  improve balance, coordination, kinesthetic sense, posture, core stability and proprioception to improve patient's ability to develop conscious control of individual muscles and

## 2025-01-30 ENCOUNTER — HOSPITAL ENCOUNTER (OUTPATIENT)
Facility: HOSPITAL | Age: 89
Setting detail: RECURRING SERIES
End: 2025-01-30
Payer: MEDICARE

## 2025-01-30 PROCEDURE — 97110 THERAPEUTIC EXERCISES: CPT

## 2025-01-30 PROCEDURE — 97112 NEUROMUSCULAR REEDUCATION: CPT

## 2025-01-30 NOTE — PROGRESS NOTES
PHYSICAL THERAPY - MEDICARE DAILY TREATMENT NOTE (updated 3/23)      Date: 2025          Patient Name:  Suzanne Mcwilliams :  1932   Medical   Diagnosis:  Right leg pain [M79.604]  Primary osteoarthritis of right knee [M17.11] Treatment Diagnosis:  M25.561  RIGHT KNEE PAIN    Referral Source:  Meagan Haddad MD Insurance:   Payor: [facility] MEDICARE / Plan: [facility] FULL DUAL ADVANTAGE 2 / Product Type: *No Product type* /                     Patient  verified yes     Visit #   Current  / Total 3 16   Time   In / Out 1400 1445   Total Treatment Time 45   Total Timed Codes 45   1:1 Treatment Time 45      [facility] Totals Reminder:  bill using total billable   min of TIMED therapeutic procedures and modalities.   8-22 min = 1 unit; 23-37 min = 2 units; 38-52 min = 3 units; 53-67 min = 4 units; 68-82 min = 5 units        .episode  SUBJECTIVE    Pain Level (0-10 scale): 0/10    Any medication changes, allergies to medications, adverse drug reactions, diagnosis change, or new procedure performed?: [x] No    [] Yes (see summary sheet for update)  Medications: Verified on Patient Summary List    Subjective functional status/changes:     \" I did exercise at \"    OBJECTIVE      Therapeutic Procedures:  Tx Min Billable or 1:1 Min (if diff from Tx Min) Procedure, Rationale, Specifics   20 20 59931 Therapeutic Exercise (timed):  increase ROM, strength, coordination, balance, and proprioception to improve patient's ability to progress to PLOF and address remaining functional goals. (see flow sheet as applicable)     Details if applicable:    Nustep  level 1 x 7'  Seated LAQ x 20  Seated marching x15 reps each     25 25 47654 Neuromuscular Re-Education (timed):  improve balance, coordination, kinesthetic sense, posture, core stability and proprioception to improve patient's ability to develop conscious control of individual muscles and awareness of position of extremities in order to progress to PLOF and address

## 2025-02-05 ENCOUNTER — HOSPITAL ENCOUNTER (OUTPATIENT)
Facility: HOSPITAL | Age: 89
Setting detail: RECURRING SERIES
Discharge: HOME | End: 2025-02-08
Payer: MEDICARE

## 2025-02-05 PROCEDURE — 97110 THERAPEUTIC EXERCISES: CPT

## 2025-02-05 PROCEDURE — 97112 NEUROMUSCULAR REEDUCATION: CPT

## 2025-02-05 NOTE — PROGRESS NOTES
PHYSICAL THERAPY - MEDICARE DAILY TREATMENT NOTE (updated 3/23)      Date: 2025          Patient Name:  Suzanne Mcwilliams :  1932   Medical   Diagnosis:  Right leg pain [M79.604]  Primary osteoarthritis of right knee [M17.11] Treatment Diagnosis:  M25.561  RIGHT KNEE PAIN    Referral Source:  Meagan Haddad MD Insurance:   Payor: [facility] MEDICARE / Plan: [facility] FULL DUAL ADVANTAGE 2 / Product Type: *No Product type* /                     Patient  verified yes     Visit #   Current  / Total 4 16   Time   In / Out 1045 1130   Total Treatment Time 45   Total Timed Codes 45   1:1 Treatment Time 45      [facility] Totals Reminder:  bill using total billable   min of TIMED therapeutic procedures and modalities.   8-22 min = 1 unit; 23-37 min = 2 units; 38-52 min = 3 units; 53-67 min = 4 units; 68-82 min = 5 units        .episode  SUBJECTIVE    Pain Level (0-10 scale): 0/10    Any medication changes, allergies to medications, adverse drug reactions, diagnosis change, or new procedure performed?: [x] No    [] Yes (see summary sheet for update)  Medications: Verified on Patient Summary List    Subjective functional status/changes:     \" I have just been watching tv\"    OBJECTIVE      Therapeutic Procedures:  Tx Min Billable or 1:1 Min (if diff from Tx Min) Procedure, Rationale, Specifics   15 15 06099 Therapeutic Exercise (timed):  increase ROM, strength, coordination, balance, and proprioception to improve patient's ability to progress to PLOF and address remaining functional goals. (see flow sheet as applicable)     Details if applicable:    Nustep  level 1 x 7'  Seated LAQ x 20 2#     30 30 84014 Neuromuscular Re-Education (timed):  improve balance, coordination, kinesthetic sense, posture, core stability and proprioception to improve patient's ability to develop conscious control of individual muscles and awareness of position of extremities in order to progress to PLOF and address remaining functional goals.

## 2025-02-10 ENCOUNTER — HOSPITAL ENCOUNTER (OUTPATIENT)
Facility: HOSPITAL | Age: 89
Setting detail: RECURRING SERIES
Discharge: HOME | End: 2025-02-13
Payer: MEDICARE

## 2025-02-10 PROCEDURE — 97110 THERAPEUTIC EXERCISES: CPT

## 2025-02-10 PROCEDURE — 97112 NEUROMUSCULAR REEDUCATION: CPT

## 2025-02-10 NOTE — PROGRESS NOTES
PHYSICAL THERAPY - MEDICARE DAILY TREATMENT NOTE (updated 3/23)      Date: 2/10/2025          Patient Name:  Suzanne Mcwilliams :  1932   Medical   Diagnosis:  Right leg pain [M79.604]  Primary osteoarthritis of right knee [M17.11] Treatment Diagnosis:  M25.561  RIGHT KNEE PAIN    Referral Source:  Meagan Haddad MD Insurance:   Payor: [facility] MEDICARE / Plan: [facility] FULL DUAL ADVANTAGE 2 / Product Type: *No Product type* /                     Patient  verified yes     Visit #   Current  / Total 5 16   Time   In / Out 1130 1230   Total Treatment Time 60   Total Timed Codes 60   1:1 Treatment Time 60      [facility] Totals Reminder:  bill using total billable   min of TIMED therapeutic procedures and modalities.   8-22 min = 1 unit; 23-37 min = 2 units; 38-52 min = 3 units; 53-67 min = 4 units; 68-82 min = 5 units        .episode  SUBJECTIVE    Pain Level (0-10 scale): 0/10    Any medication changes, allergies to medications, adverse drug reactions, diagnosis change, or new procedure performed?: [x] No    [] Yes (see summary sheet for update)  Medications: Verified on Patient Summary List    Subjective functional status/changes:     \" I been watching a lot of television\"    OBJECTIVE      Therapeutic Procedures:  Tx Min Billable or 1:1 Min (if diff from Tx Min) Procedure, Rationale, Specifics   30 30 38508 Therapeutic Exercise (timed):  increase ROM, strength, coordination, balance, and proprioception to improve patient's ability to progress to PLOF and address remaining functional goals. (see flow sheet as applicable)     Details if applicable:    Old nustep level 4 x 10 minutes 45-67 steps/minutes  Seated Lg swiss ball roll out to stretch back to hemp with LE and core stretching/ROM    Patient education of exercises, gait mechanics, and encouragement to participate with activity at home.  Education on walker being too tall and too heavy. Will discuss with family as well.    30 30 34385 Neuromuscular

## 2025-02-14 ENCOUNTER — HOSPITAL ENCOUNTER (OUTPATIENT)
Facility: HOSPITAL | Age: 89
Setting detail: RECURRING SERIES
Discharge: HOME | End: 2025-02-17
Payer: MEDICARE

## 2025-02-14 PROCEDURE — 97530 THERAPEUTIC ACTIVITIES: CPT

## 2025-02-14 NOTE — PROGRESS NOTES
PHYSICAL THERAPY - MEDICARE DAILY TREATMENT NOTE (updated 3/23)      Date: 2025          Patient Name:  Suzanne Mcwilliams :  1932   Medical   Diagnosis:  Right leg pain [M79.604]  Primary osteoarthritis of right knee [M17.11] Treatment Diagnosis:  M25.561  RIGHT KNEE PAIN    Referral Source:  Meagan Haddad MD Insurance:   Payor: [facility] MEDICARE / Plan: [facility] FULL DUAL ADVANTAGE 2 / Product Type: *No Product type* /                     Patient  verified yes     Visit #   Current  / Total 6 16   Time   In / Out 1000 1045   Total Treatment Time 45   Total Timed Codes 45   1:1 Treatment Time 45      [facility] Totals Reminder:  bill using total billable   min of TIMED therapeutic procedures and modalities.   8-22 min = 1 unit; 23-37 min = 2 units; 38-52 min = 3 units; 53-67 min = 4 units; 68-82 min = 5 units        .episode  SUBJECTIVE    Pain Level (0-10 scale): 0/10    Any medication changes, allergies to medications, adverse drug reactions, diagnosis change, or new procedure performed?: [x] No    [] Yes (see summary sheet for update)  Medications: Verified on Patient Summary List    Subjective functional status/changes:     \"I only watch television\"    OBJECTIVE      Therapeutic Procedures:  Tx Min Billable or 1:1 Min (if diff from Tx Min) Procedure, Rationale, Specifics   45 45 75744 Therapeutic Exercise (timed):  increase ROM, strength, coordination, balance, and proprioception to improve patient's ability to progress to PLOF and address remaining functional goals. (see flow sheet as applicable)     Details if applicable:      Seated nustep x 10 minutes with focus on stepping over 60 steps/minute    Hip abd/add machine x 5' with rest in between for     Seated alternating marching, LAQ and Hip abd, arm tapping in all directions with 3# ankle weights without warning to patinet as to where I will be tapping.  X 5'       45 45    Total Total     [x]  Patient Education billed concurrently with other

## 2025-02-20 ENCOUNTER — HOSPITAL ENCOUNTER (OUTPATIENT)
Facility: HOSPITAL | Age: 89
Setting detail: RECURRING SERIES
Discharge: HOME | End: 2025-02-23
Payer: MEDICARE

## 2025-02-20 PROCEDURE — 97112 NEUROMUSCULAR REEDUCATION: CPT

## 2025-02-20 PROCEDURE — 97110 THERAPEUTIC EXERCISES: CPT

## 2025-02-20 NOTE — PROGRESS NOTES
PHYSICAL THERAPY - MEDICARE DAILY TREATMENT NOTE (updated 3/23)      Date: 2025          Patient Name:  Suzanne Mcwilliams :  1932   Medical   Diagnosis:  Right leg pain [M79.604]  Primary osteoarthritis of right knee [M17.11] Treatment Diagnosis:  M25.561  RIGHT KNEE PAIN    Referral Source:  Meagan Haddad MD Insurance:   Payor: [facility] MEDICARE / Plan: [facility] FULL DUAL ADVANTAGE 2 / Product Type: *No Product type* /                     Patient  verified yes     Visit #   Current  / Total 7 16   Time   In / Out 0930 1000   Total Treatment Time 30   Total Timed Codes 30   1:1 Treatment Time 30      [facility] Totals Reminder:  bill using total billable   min of TIMED therapeutic procedures and modalities.   8-22 min = 1 unit; 23-37 min = 2 units; 38-52 min = 3 units; 53-67 min = 4 units; 68-82 min = 5 units        .episode  SUBJECTIVE    Pain Level (0-10 scale): L lower leg 2/10    Any medication changes, allergies to medications, adverse drug reactions, diagnosis change, or new procedure performed?: [x] No    [] Yes (see summary sheet for update)  Medications: Verified on Patient Summary List    Subjective functional status/changes:     \"I feel good\"    OBJECTIVE      Therapeutic Procedures:  Tx Min Billable or 1:1 Min (if diff from Tx Min) Procedure, Rationale, Specifics   15 15 26530 Therapeutic Exercise (timed):  increase ROM, strength, coordination, balance, and proprioception to improve patient's ability to progress to PLOF and address remaining functional goals. (see flow sheet as applicable)     Details if applicable:        Seated nustep x 10 minutes with focus on stepping over 60 steps/minute     Hip abd/add machine x 5' with rest in between for        15 15 14249 Neuromuscular Re-Education (timed):  improve balance, coordination, kinesthetic sense, posture, core stability and proprioception to improve patient's ability to develop conscious control of individual muscles and awareness of position

## 2025-02-20 NOTE — PROGRESS NOTES
[facility]  [address] [place] [address]  Office [phone]  Fax [phone]   PHYSICAL THERAPY PROGRESS NOTE  Patient Name:  Suzanne Mcwilliams :  1932   Treatment/Medical Diagnosis: Right leg pain [M79.604]  Primary osteoarthritis of right knee [M17.11]   Referral Source:  Meagan Haddad MD     Date of Initial Visit:  2025 Attended Visits:  7 Missed Visits:  0     SUMMARY OF TREATMENT/ASSESSMENT:  Patient continues to be showing increased confidence with ambulation, and is taking bigger RLE steps without as many v/t cues for positioning.  Patient is increasing her resistance throughout her activities at this time.  Patient continues to require skilled PT services to improve quality of life and movement to decrease fall risk.      CURRENT STATUS  30\" Chair Rise- 10 x with UE usage, 1 without UE usage(very nervous)    Short Term Goals: To be accomplished in 8 treatments   Patient will be independent in a HEP. PROGRESSING  Patient will perform transfers independently.PROGRESSING  Patient will perform bed mobility independently.PROGRESSING  Patient will ambulate 100' with supervised independence with the RW.PROGRESSING  Long Term Goals: To be accomplished in 16 treatments   Patient will ambulate 120' with the RW independently in the clinic.  Patient and family will report patient is independent with her transfers, gait, and bed mobility at home.         RECOMMENDATIONS  Continue 1-2x/week for additional 16 visits        Cori Gupta, [place]       2025       11:13 AM  Chasity Berman PT  If you have any questions/comments please contact us directly at [phone].   Thank you for allowing us to assist in the care of your patient.

## 2025-02-24 ENCOUNTER — APPOINTMENT (OUTPATIENT)
Facility: HOSPITAL | Age: 89
End: 2025-02-24
Payer: MEDICARE

## 2025-04-05 DIAGNOSIS — E78.49 OTHER HYPERLIPIDEMIA: ICD-10-CM

## 2025-04-07 RX ORDER — PRAVASTATIN SODIUM 20 MG
20 TABLET ORAL NIGHTLY
Qty: 90 TABLET | Refills: 0 | Status: SHIPPED | OUTPATIENT
Start: 2025-04-07

## 2025-04-07 NOTE — TELEPHONE ENCOUNTER
Requested Prescriptions     Signed Prescriptions Disp Refills    pravastatin (PRAVACHOL) 20 MG tablet 90 tablet 0     Sig: TAKE 1 TABLET BY MOUTH EVERY DAY AT NIGHT     Authorizing Provider: KAYLIN BENNETT     Last seen:  1/9/25    Next apt:  7/14/25    Last filled on today, this is a duplicate request.   I will refuse the request as it was already done.

## 2025-04-13 DIAGNOSIS — E87.6 HYPOKALEMIA: ICD-10-CM

## 2025-04-14 RX ORDER — POTASSIUM CHLORIDE 750 MG/1
10 TABLET, EXTENDED RELEASE ORAL DAILY
Qty: 90 TABLET | OUTPATIENT
Start: 2025-04-14

## 2025-04-14 NOTE — TELEPHONE ENCOUNTER
Requested Prescriptions     Pending Prescriptions Disp Refills    potassium chloride (KLOR-CON M) 10 MEQ extended release tablet [Pharmacy Med Name: POTASSIUM CL ER 10 MEQ TAB MCR] 90 tablet      Sig: TAKE 1 TABLET BY MOUTH EVERY DAY     Last seen:  1/9/25    This RX was last filled on 1/9/25 for a 90 day supply with 1 refill.  This should give patient medication through 7/25.    I have called [facility] and spoke to Vicenta.  She confirms that this request is a \"Proactive:\" request, meaning that the patient picked up her last refill of this medication and they are trying to get approval for her next refill due.  I will cancel this request as it is too soon.

## 2025-06-13 ENCOUNTER — TELEPHONE (OUTPATIENT)
Dept: PHARMACY | Facility: CLINIC | Age: 89
End: 2025-06-13

## 2025-06-13 NOTE — TELEPHONE ENCOUNTER
[facility] CLINICAL PHARMACY: ADHERENCE REVIEW  Identified care gap per [place] Neighbors: fills at [facility]: Statin adherence      ASSESSMENT  STATIN ADHERENCE    Insurance Records claims through 25 (Prior Year PDC = not reported; YTD PDC = FIRST FILL; Potential Fail Date: 6/15/25):   PRAVASTATIN  TAB 20MG last filled on 25 for 90 day supply. Next refill due: 25    Prescribed si tablet/capsule daily    Per [facility]:  unable to reach pharmacy     [id number]    Lab Results   Component Value Date    CHOL 193 2024    TRIG 83 2024     2024     Lab Results   Component Value Date    LDL 69.4 2024      ALT   Date Value Ref Range Status   2024 14 12 - 78 U/L Final     AST   Date Value Ref Range Status   2024 16 15 - 37 U/L Final     The ASCVD Risk score (Compa DK, et al., 2019) failed to calculate for the following reasons:    The 2019 ASCVD risk score is only valid for ages 40 to 79     LIS    The following are interventions that have been identified:   Patient OVERDUE refilling PRAVASTATIN  TAB 20MG and active on home medication list.     Attempting to reach patient to review.  Left message asking for return call. [employer] message sent to patient.      Last Visit: 25  Next Visit: 25    [place] David Duran PharmD     (Will try 2nd attempt)  Kim Jamison CPhT.   [facility] Clinical   [facility]  Toll free: [phone] Option 1

## 2025-06-16 NOTE — TELEPHONE ENCOUNTER
Unable to reach pharmacy-rings busy.  Left message for patients daughter to call back    For Pharmacy Admin Tracking Only    Program: [employer]  CPA in place:  No  Gap Closed?: No   Time Spent (min): 15

## 2025-07-11 DIAGNOSIS — I10 ESSENTIAL (PRIMARY) HYPERTENSION: ICD-10-CM

## 2025-07-11 RX ORDER — HYDROCHLOROTHIAZIDE 12.5 MG/1
12.5 TABLET ORAL DAILY
Qty: 30 TABLET | Refills: 0 | Status: SHIPPED | OUTPATIENT
Start: 2025-07-11

## 2025-07-11 RX ORDER — AMLODIPINE BESYLATE 10 MG/1
10 TABLET ORAL DAILY
Qty: 90 TABLET | Refills: 0 | Status: SHIPPED | OUTPATIENT
Start: 2025-07-11

## 2025-07-11 NOTE — TELEPHONE ENCOUNTER
Patient requesting refill on     Requested Prescriptions     Pending Prescriptions Disp Refills    amLODIPine (NORVASC) 10 MG tablet [Pharmacy Med Name: AMLODIPINE BESYLATE 10 MG TAB] 90 tablet 1     Sig: TAKE 1 TABLET BY MOUTH EVERY DAY        Last OV 1/9/2025

## 2025-07-11 NOTE — TELEPHONE ENCOUNTER
Patient requesting refill on     Requested Prescriptions     Pending Prescriptions Disp Refills    hydroCHLOROthiazide 12.5 MG tablet [Pharmacy Med Name: HYDROCHLOROTHIAZIDE 12.5 MG TB] 90 tablet 1     Sig: TAKE 1 TABLET BY MOUTH EVERY DAY        Last OV 1/9/2025

## 2025-07-14 ENCOUNTER — OFFICE VISIT (OUTPATIENT)
Dept: FAMILY MEDICINE CLINIC | Age: 89
End: 2025-07-14
Payer: MEDICARE

## 2025-07-14 VITALS
HEART RATE: 64 BPM | HEIGHT: 55 IN | DIASTOLIC BLOOD PRESSURE: 64 MMHG | WEIGHT: 97 LBS | SYSTOLIC BLOOD PRESSURE: 126 MMHG | TEMPERATURE: 97.8 F | RESPIRATION RATE: 12 BRPM | BODY MASS INDEX: 22.45 KG/M2 | OXYGEN SATURATION: 97 %

## 2025-07-14 DIAGNOSIS — I10 ESSENTIAL (PRIMARY) HYPERTENSION: ICD-10-CM

## 2025-07-14 DIAGNOSIS — E11.9 TYPE 2 DIABETES MELLITUS WITHOUT COMPLICATION, WITHOUT LONG-TERM CURRENT USE OF INSULIN (HCC): ICD-10-CM

## 2025-07-14 DIAGNOSIS — E78.49 OTHER HYPERLIPIDEMIA: ICD-10-CM

## 2025-07-14 DIAGNOSIS — Z00.00 MEDICARE ANNUAL WELLNESS VISIT, SUBSEQUENT: Primary | ICD-10-CM

## 2025-07-14 DIAGNOSIS — E03.9 HYPOTHYROIDISM, UNSPECIFIED TYPE: ICD-10-CM

## 2025-07-14 DIAGNOSIS — E87.6 HYPOKALEMIA: ICD-10-CM

## 2025-07-14 PROCEDURE — G0439 PPPS, SUBSEQ VISIT: HCPCS | Performed by: FAMILY MEDICINE

## 2025-07-14 PROCEDURE — 3044F HG A1C LEVEL LT 7.0%: CPT | Performed by: FAMILY MEDICINE

## 2025-07-14 PROCEDURE — 36415 COLL VENOUS BLD VENIPUNCTURE: CPT | Performed by: FAMILY MEDICINE

## 2025-07-14 PROCEDURE — 99214 OFFICE O/P EST MOD 30 MIN: CPT | Performed by: FAMILY MEDICINE

## 2025-07-14 PROCEDURE — 1159F MED LIST DOCD IN RCRD: CPT | Performed by: FAMILY MEDICINE

## 2025-07-14 PROCEDURE — 1123F ACP DISCUSS/DSCN MKR DOCD: CPT | Performed by: FAMILY MEDICINE

## 2025-07-14 PROCEDURE — 1126F AMNT PAIN NOTED NONE PRSNT: CPT | Performed by: FAMILY MEDICINE

## 2025-07-14 RX ORDER — POTASSIUM CHLORIDE 750 MG/1
10 TABLET, EXTENDED RELEASE ORAL 2 TIMES DAILY
Qty: 180 TABLET | Refills: 1 | Status: SHIPPED | OUTPATIENT
Start: 2025-07-14

## 2025-07-14 RX ORDER — LEVOTHYROXINE SODIUM 75 UG/1
TABLET ORAL
Qty: 100 TABLET | Refills: 1 | Status: SHIPPED | OUTPATIENT
Start: 2025-07-14

## 2025-07-14 RX ORDER — PRAVASTATIN SODIUM 20 MG
20 TABLET ORAL NIGHTLY
Qty: 90 TABLET | Refills: 1 | Status: SHIPPED | OUTPATIENT
Start: 2025-07-14

## 2025-07-14 ASSESSMENT — PATIENT HEALTH QUESTIONNAIRE - PHQ9: DEPRESSION UNABLE TO ASSESS: FUNCTIONAL CAPACITY MOTIVATION LIMITS ACCURACY

## 2025-07-14 ASSESSMENT — ENCOUNTER SYMPTOMS
GASTROINTESTINAL NEGATIVE: 1
COUGH: 0

## 2025-07-14 NOTE — PROGRESS NOTES
today:  HTN, Hyperlipidemia, DM, Hypothyroid    Patient's complete Health Risk Assessment and screening values have been reviewed and are found in Flowsheets. The following problems were reviewed today and where indicated follow up appointments were made and/or referrals ordered.    Positive Risk Factor Screenings with Interventions:    Fall Risk:  Do you feel unsteady or are you worried about falling? : (!) yes  2 or more falls in past year?: no  Fall with injury in past year?: no  Interventions:    Patient comments: has emergency call button and using walker at home  Patient declines any further evaluation or treatment     Depression:  Depression Unable to Assess: Functional capacity motivation limits accuracy                       Advanced Directives:  Do you have a Living Will?: (!) No    Intervention:  has NO advanced directive - information provided                     Objective   Vitals:    07/14/25 1039   BP: 126/64   BP Site: Right Upper Arm   Patient Position: Sitting   BP Cuff Size: Medium Adult   Pulse: 64   Resp: 12   Temp: 97.8 °F (36.6 °C)   SpO2: 97%   Weight: 44 kg (97 lb)   Height: 1.397 m (4' 7\")      Body mass index is 22.54 kg/m².                  No Known Allergies  Prior to Visit Medications    Medication Sig Taking? Authorizing Provider   levothyroxine (SYNTHROID) 75 MCG tablet TAKE 1 TAB BY MOUTH EVERY DAY EXCEPT ONE AND A HALF ON SUNDAYS Yes Meagan Haddad MD   potassium chloride (KLOR-CON M10) 10 MEQ extended release tablet Take 1 tablet by mouth 2 times daily Yes Meagan Haddad MD   pravastatin (PRAVACHOL) 20 MG tablet Take 1 tablet by mouth nightly Yes Meagan Haddad MD   hydroCHLOROthiazide 12.5 MG tablet TAKE 1 TABLET BY MOUTH EVERY DAY Yes Meagan Haddad MD   amLODIPine (NORVASC) 10 MG tablet TAKE 1 TABLET BY MOUTH EVERY DAY Yes Meagan Haddad MD   calcium carb-cholecalciferol 600-10 MG-MCG TABS per tab Take 1 tablet by mouth daily Yes Automatic

## 2025-07-16 LAB
ALBUMIN SERPL-MCNC: 4.2 G/DL (ref 3.6–4.6)
ALP SERPL-CCNC: 114 IU/L (ref 44–121)
ALT SERPL-CCNC: 25 IU/L (ref 0–32)
AST SERPL-CCNC: 31 IU/L (ref 0–40)
BASOPHILS # BLD AUTO: 0 X10E3/UL (ref 0–0.2)
BASOPHILS NFR BLD AUTO: 0 %
BILIRUB SERPL-MCNC: 0.3 MG/DL (ref 0–1.2)
BUN SERPL-MCNC: 20 MG/DL (ref 10–36)
BUN/CREAT SERPL: 24 (ref 12–28)
CALCIUM SERPL-MCNC: 9.4 MG/DL (ref 8.7–10.3)
CHLORIDE SERPL-SCNC: 98 MMOL/L (ref 96–106)
CHOLEST SERPL-MCNC: 178 MG/DL (ref 100–199)
CO2 SERPL-SCNC: 22 MMOL/L (ref 20–29)
CREAT SERPL-MCNC: 0.84 MG/DL (ref 0.57–1)
EGFRCR SERPLBLD CKD-EPI 2021: 65 ML/MIN/1.73
EOSINOPHIL # BLD AUTO: 0.1 X10E3/UL (ref 0–0.4)
EOSINOPHIL NFR BLD AUTO: 2 %
ERYTHROCYTE [DISTWIDTH] IN BLOOD BY AUTOMATED COUNT: 13.2 % (ref 11.7–15.4)
EST. AVERAGE GLUCOSE BLD GHB EST-MCNC: 131 MG/DL
GLOBULIN SER CALC-MCNC: 2.6 G/DL (ref 1.5–4.5)
GLUCOSE SERPL-MCNC: 84 MG/DL (ref 70–99)
HBA1C MFR BLD: 6.2 % (ref 4.8–5.6)
HCT VFR BLD AUTO: 44 % (ref 34–46.6)
HDLC SERPL-MCNC: 83 MG/DL
HGB BLD-MCNC: 13.9 G/DL (ref 11.1–15.9)
IMM GRANULOCYTES # BLD AUTO: 0 X10E3/UL (ref 0–0.1)
IMM GRANULOCYTES NFR BLD AUTO: 0 %
IMP & REVIEW OF LAB RESULTS: NORMAL
LDLC SERPL CALC-MCNC: 80 MG/DL (ref 0–99)
LYMPHOCYTES # BLD AUTO: 1.5 X10E3/UL (ref 0.7–3.1)
LYMPHOCYTES NFR BLD AUTO: 24 %
MCH RBC QN AUTO: 29.1 PG (ref 26.6–33)
MCHC RBC AUTO-ENTMCNC: 31.6 G/DL (ref 31.5–35.7)
MCV RBC AUTO: 92 FL (ref 79–97)
MONOCYTES # BLD AUTO: 0.7 X10E3/UL (ref 0.1–0.9)
MONOCYTES NFR BLD AUTO: 12 %
NEUTROPHILS # BLD AUTO: 3.8 X10E3/UL (ref 1.4–7)
NEUTROPHILS NFR BLD AUTO: 62 %
PLATELET # BLD AUTO: 199 X10E3/UL (ref 150–450)
POTASSIUM SERPL-SCNC: 4.1 MMOL/L (ref 3.5–5.2)
PROT SERPL-MCNC: 6.8 G/DL (ref 6–8.5)
RBC # BLD AUTO: 4.77 X10E6/UL (ref 3.77–5.28)
SODIUM SERPL-SCNC: 139 MMOL/L (ref 134–144)
TRIGL SERPL-MCNC: 82 MG/DL (ref 0–149)
TSH SERPL DL<=0.005 MIU/L-ACNC: 2.46 UIU/ML (ref 0.45–4.5)
VLDLC SERPL CALC-MCNC: 15 MG/DL (ref 5–40)
WBC # BLD AUTO: 6.2 X10E3/UL (ref 3.4–10.8)

## 2025-08-03 DIAGNOSIS — I10 ESSENTIAL (PRIMARY) HYPERTENSION: ICD-10-CM

## 2025-08-03 RX ORDER — HYDROCHLOROTHIAZIDE 12.5 MG/1
12.5 TABLET ORAL DAILY
Qty: 90 TABLET | Refills: 1 | Status: SHIPPED | OUTPATIENT
Start: 2025-08-03